# Patient Record
Sex: FEMALE | URBAN - METROPOLITAN AREA
[De-identification: names, ages, dates, MRNs, and addresses within clinical notes are randomized per-mention and may not be internally consistent; named-entity substitution may affect disease eponyms.]

---

## 2023-12-14 ENCOUNTER — OFFICE VISIT (OUTPATIENT)
Dept: PRIMARY CARE | Facility: CLINIC | Age: 88
End: 2023-12-14
Payer: MEDICAID

## 2023-12-14 ENCOUNTER — LAB (OUTPATIENT)
Dept: LAB | Facility: LAB | Age: 88
End: 2023-12-14
Payer: MEDICAID

## 2023-12-14 VITALS
HEART RATE: 64 BPM | TEMPERATURE: 97 F | OXYGEN SATURATION: 99 % | DIASTOLIC BLOOD PRESSURE: 78 MMHG | SYSTOLIC BLOOD PRESSURE: 128 MMHG | BODY MASS INDEX: 32.31 KG/M2 | WEIGHT: 164.6 LBS | HEIGHT: 60 IN

## 2023-12-14 DIAGNOSIS — R20.2 PARESTHESIAS: ICD-10-CM

## 2023-12-14 DIAGNOSIS — R42 VERTIGO: ICD-10-CM

## 2023-12-14 DIAGNOSIS — Z76.89 ENCOUNTER TO ESTABLISH CARE WITH NEW DOCTOR: Primary | ICD-10-CM

## 2023-12-14 DIAGNOSIS — R31.21 ASYMPTOMATIC MICROSCOPIC HEMATURIA: ICD-10-CM

## 2023-12-14 DIAGNOSIS — R42 DIZZINESS: ICD-10-CM

## 2023-12-14 DIAGNOSIS — E55.9 VITAMIN D DEFICIENCY: ICD-10-CM

## 2023-12-14 DIAGNOSIS — E83.52 HYPERCALCEMIA: ICD-10-CM

## 2023-12-14 DIAGNOSIS — M54.9 UPPER BACK PAIN: ICD-10-CM

## 2023-12-14 DIAGNOSIS — F51.04 PSYCHOPHYSIOLOGICAL INSOMNIA: ICD-10-CM

## 2023-12-14 PROBLEM — M62.81 MUSCLE WEAKNESS (GENERALIZED): Status: ACTIVE | Noted: 2022-11-16

## 2023-12-14 PROBLEM — M48.062 SPINAL STENOSIS, LUMBAR REGION WITH NEUROGENIC CLAUDICATION: Status: ACTIVE | Noted: 2022-08-26

## 2023-12-14 PROBLEM — I10 ESSENTIAL HYPERTENSION, BENIGN: Status: ACTIVE | Noted: 2023-03-13

## 2023-12-14 PROBLEM — E78.2 MIXED HYPERLIPIDEMIA: Status: ACTIVE | Noted: 2023-12-14

## 2023-12-14 PROBLEM — M54.50 CHRONIC BILATERAL LOW BACK PAIN WITHOUT SCIATICA: Status: ACTIVE | Noted: 2022-05-24

## 2023-12-14 PROBLEM — E03.9 HYPOTHYROIDISM: Status: ACTIVE | Noted: 2023-02-03

## 2023-12-14 PROBLEM — G89.29 CHRONIC BILATERAL LOW BACK PAIN WITHOUT SCIATICA: Status: ACTIVE | Noted: 2022-05-24

## 2023-12-14 PROBLEM — R06.02 SHORTNESS OF BREATH: Status: ACTIVE | Noted: 2023-03-13

## 2023-12-14 LAB
CA-I BLD-SCNC: 1.42 MMOL/L (ref 1.1–1.33)
FOLATE SERPL-MCNC: 7.3 NG/ML (ref 4.2–19.9)
PTH-INTACT SERPL-MCNC: 85.5 PG/ML (ref 18.5–88)
VIT B12 SERPL-MCNC: 801 PG/ML (ref 211–946)

## 2023-12-14 PROCEDURE — 1125F AMNT PAIN NOTED PAIN PRSNT: CPT | Performed by: FAMILY MEDICINE

## 2023-12-14 PROCEDURE — 99204 OFFICE O/P NEW MOD 45 MIN: CPT | Performed by: FAMILY MEDICINE

## 2023-12-14 PROCEDURE — 1159F MED LIST DOCD IN RCRD: CPT | Performed by: FAMILY MEDICINE

## 2023-12-14 PROCEDURE — 83970 ASSAY OF PARATHORMONE: CPT

## 2023-12-14 PROCEDURE — 82746 ASSAY OF FOLIC ACID SERUM: CPT

## 2023-12-14 PROCEDURE — 3074F SYST BP LT 130 MM HG: CPT | Performed by: FAMILY MEDICINE

## 2023-12-14 PROCEDURE — 82607 VITAMIN B-12: CPT

## 2023-12-14 PROCEDURE — 99214 OFFICE O/P EST MOD 30 MIN: CPT | Performed by: FAMILY MEDICINE

## 2023-12-14 PROCEDURE — 1160F RVW MEDS BY RX/DR IN RCRD: CPT | Performed by: FAMILY MEDICINE

## 2023-12-14 PROCEDURE — 82330 ASSAY OF CALCIUM: CPT

## 2023-12-14 PROCEDURE — 3078F DIAST BP <80 MM HG: CPT | Performed by: FAMILY MEDICINE

## 2023-12-14 PROCEDURE — 1036F TOBACCO NON-USER: CPT | Performed by: FAMILY MEDICINE

## 2023-12-14 PROCEDURE — 36415 COLL VENOUS BLD VENIPUNCTURE: CPT

## 2023-12-14 RX ORDER — PANTOPRAZOLE SODIUM 40 MG/1
40 TABLET, DELAYED RELEASE ORAL AS NEEDED
COMMUNITY
Start: 2023-10-17

## 2023-12-14 RX ORDER — ASPIRIN 81 MG/1
81 TABLET ORAL DAILY
COMMUNITY
Start: 2023-12-04

## 2023-12-14 RX ORDER — ATORVASTATIN CALCIUM 20 MG/1
20 TABLET, FILM COATED ORAL DAILY
COMMUNITY
Start: 2023-11-21

## 2023-12-14 RX ORDER — VALSARTAN 80 MG/1
80 TABLET ORAL DAILY
COMMUNITY
Start: 2023-11-21 | End: 2024-04-04 | Stop reason: DRUGHIGH

## 2023-12-14 RX ORDER — BRIMONIDINE TARTRATE 2 MG/ML
1 SOLUTION/ DROPS OPHTHALMIC 2 TIMES DAILY
COMMUNITY
Start: 2023-11-21 | End: 2024-04-04 | Stop reason: WASHOUT

## 2023-12-14 RX ORDER — DORZOLAMIDE HYDROCHLORIDE AND TIMOLOL MALEATE 20; 5 MG/ML; MG/ML
1 SOLUTION/ DROPS OPHTHALMIC 2 TIMES DAILY
COMMUNITY
Start: 2023-08-04

## 2023-12-14 RX ORDER — MULTIVITAMIN
1 TABLET ORAL DAILY
COMMUNITY
Start: 2023-12-07

## 2023-12-14 RX ORDER — HYDROXYZINE HYDROCHLORIDE 10 MG/1
10 TABLET, FILM COATED ORAL NIGHTLY PRN
Qty: 30 TABLET | Refills: 1 | Status: SHIPPED | OUTPATIENT
Start: 2023-12-14 | End: 2024-01-11 | Stop reason: SDUPTHER

## 2023-12-14 RX ORDER — FLUTICASONE PROPIONATE 50 MCG
2 SPRAY, SUSPENSION (ML) NASAL DAILY
COMMUNITY
Start: 2023-11-21

## 2023-12-14 RX ORDER — PREDNISOLONE ACETATE 10 MG/ML
1 SUSPENSION/ DROPS OPHTHALMIC DAILY
COMMUNITY
Start: 2023-11-16

## 2023-12-14 RX ORDER — METHAZOLAMIDE 50 MG/1
50 TABLET ORAL 2 TIMES DAILY
COMMUNITY
Start: 2023-12-01

## 2023-12-14 RX ORDER — MECLIZINE HCL 12.5 MG 12.5 MG/1
12.5 TABLET ORAL 2 TIMES DAILY PRN
COMMUNITY
Start: 2023-12-07 | End: 2024-02-08 | Stop reason: SDUPTHER

## 2023-12-14 RX ORDER — AMLODIPINE BESYLATE 5 MG/1
5 TABLET ORAL DAILY
COMMUNITY
Start: 2023-11-20 | End: 2024-02-08 | Stop reason: SINTOL

## 2023-12-14 RX ORDER — LEVOTHYROXINE SODIUM 75 UG/1
75 TABLET ORAL
COMMUNITY
Start: 2023-11-21

## 2023-12-14 ASSESSMENT — ENCOUNTER SYMPTOMS
DEPRESSION: 0
OCCASIONAL FEELINGS OF UNSTEADINESS: 0
LOSS OF SENSATION IN FEET: 1

## 2023-12-14 ASSESSMENT — PAIN SCALES - GENERAL: PAINLEVEL: 8

## 2023-12-14 ASSESSMENT — LIFESTYLE VARIABLES: HOW MANY STANDARD DRINKS CONTAINING ALCOHOL DO YOU HAVE ON A TYPICAL DAY: PATIENT DOES NOT DRINK

## 2023-12-14 ASSESSMENT — PATIENT HEALTH QUESTIONNAIRE - PHQ9
SUM OF ALL RESPONSES TO PHQ9 QUESTIONS 1 AND 2: 0
1. LITTLE INTEREST OR PLEASURE IN DOING THINGS: NOT AT ALL
2. FEELING DOWN, DEPRESSED OR HOPELESS: NOT AT ALL

## 2023-12-14 NOTE — PATIENT INSTRUCTIONS
Problem List Items Addressed This Visit    None  Visit Diagnoses         Codes    Encounter to establish care with new doctor    -  Primary Z76.89    Asymptomatic microscopic hematuria     R31.21    Relevant Orders    POCT UA (nonautomated) manually resulted    Vitamin D deficiency     E55.9            Additional Visit Plans:  Plan to recheck urine as you had some slight blood in the urgent care.     Will also recheck calcium labs. Will check Vit B12 and folic acid to see if this is adding to the numbness feeling in your leg. This may be permanent nerve damage.     Continue your Vitamin D dosing that you were doing in October to help maintain levels.     Your other labs are reassuring with no findings to suggest a cause for the dizziness.     Plan to work on posture and your upper back with physical therapy.     Plan to work with physical therapy on your activity level as tolerated. Plan to trial hydroxyzine at night to help you sleep and this very well may help lessen the dizziness.     The dropping of your left eyelid may be from [place]' Palsy. This can lift after a few months, sometimes can be permanent. Continue to see the plastic surgeon. I do not suspect a stroke at this time given the gradual onset of things over a long time.     Follow up in 4 weeks.     Next Wellness Exam/Annual Physical Due  At your earliest convenience / when due    Patient Care Team:  Jeferson Tamayo, DO as PCP - General (Family Medicine)    Jeferson Tamayo,    12/14/23   9:36 AM

## 2023-12-30 DIAGNOSIS — R42 DIZZINESS: Primary | ICD-10-CM

## 2024-01-11 ENCOUNTER — OFFICE VISIT (OUTPATIENT)
Dept: PRIMARY CARE | Facility: CLINIC | Age: 89
End: 2024-01-11
Payer: MEDICAID

## 2024-01-11 VITALS
TEMPERATURE: 97.2 F | DIASTOLIC BLOOD PRESSURE: 70 MMHG | SYSTOLIC BLOOD PRESSURE: 110 MMHG | BODY MASS INDEX: 32.47 KG/M2 | OXYGEN SATURATION: 96 % | HEART RATE: 60 BPM | WEIGHT: 165.4 LBS

## 2024-01-11 DIAGNOSIS — E83.52 HYPERCALCEMIA: ICD-10-CM

## 2024-01-11 DIAGNOSIS — R42 VERTIGO: ICD-10-CM

## 2024-01-11 DIAGNOSIS — F51.04 PSYCHOPHYSIOLOGICAL INSOMNIA: Primary | ICD-10-CM

## 2024-01-11 DIAGNOSIS — M48.062 SPINAL STENOSIS, LUMBAR REGION WITH NEUROGENIC CLAUDICATION: ICD-10-CM

## 2024-01-11 DIAGNOSIS — G89.29 OTHER CHRONIC PAIN: ICD-10-CM

## 2024-01-11 PROCEDURE — 3074F SYST BP LT 130 MM HG: CPT | Performed by: FAMILY MEDICINE

## 2024-01-11 PROCEDURE — 3078F DIAST BP <80 MM HG: CPT | Performed by: FAMILY MEDICINE

## 2024-01-11 PROCEDURE — 1159F MED LIST DOCD IN RCRD: CPT | Performed by: FAMILY MEDICINE

## 2024-01-11 PROCEDURE — 99214 OFFICE O/P EST MOD 30 MIN: CPT | Performed by: FAMILY MEDICINE

## 2024-01-11 PROCEDURE — 1036F TOBACCO NON-USER: CPT | Performed by: FAMILY MEDICINE

## 2024-01-11 PROCEDURE — 1125F AMNT PAIN NOTED PAIN PRSNT: CPT | Performed by: FAMILY MEDICINE

## 2024-01-11 PROCEDURE — 1160F RVW MEDS BY RX/DR IN RCRD: CPT | Performed by: FAMILY MEDICINE

## 2024-01-11 RX ORDER — HYDROXYZINE HYDROCHLORIDE 10 MG/1
20 TABLET, FILM COATED ORAL NIGHTLY PRN
Qty: 60 TABLET | Refills: 1 | Status: SHIPPED | OUTPATIENT
Start: 2024-01-11 | End: 2024-02-08 | Stop reason: SDUPTHER

## 2024-01-11 ASSESSMENT — LIFESTYLE VARIABLES: HOW MANY STANDARD DRINKS CONTAINING ALCOHOL DO YOU HAVE ON A TYPICAL DAY: PATIENT DOES NOT DRINK

## 2024-01-11 ASSESSMENT — ENCOUNTER SYMPTOMS
LOSS OF SENSATION IN FEET: 0
DEPRESSION: 0
OCCASIONAL FEELINGS OF UNSTEADINESS: 0

## 2024-01-11 ASSESSMENT — PAIN SCALES - GENERAL: PAINLEVEL: 7

## 2024-01-11 NOTE — PROGRESS NOTES
Outpatient Visit Note    Chief Complaint   Patient presents with    Follow-up     Followup on pain/weakness/dizziness       HPI:  Ayesha Linton is a 88 y.o. female here for follow-up on her pain and paresthesias.    She is with her  who is helping to translate.     Per chart review, since her visit with me she saw Dr. Leigh Ann Greenfield for follow-up on her low back pain.  She was being evaluated for instability with plans to refer to medical spine specialist for long-term management of back strengthening and flexibility.    She established with me last month at which time we talked about her getting paresthesias in her right leg with a cool feeling and tingling chronically since her back surgery.  Having some vertigo type feelings in her head with any movement.  Did not try the meclizine that was provided by urgent care.  When not doing anything her back feels fine but when working in the kitchen she develops upper back pain easily and feels the need to stretch things out.  Had been doing home exercises but had not been to physical therapy for a while.  Has glaucoma and hypertension.    Our plan was to pursue some blood work to look for any contributing factors towards her paresthesias.  To have her work with physical therapy on her posture and upper back as tolerated and to trial hydroxyzine at night to help with her sleep and possibly lessen her dizziness.  She was also due to make arrangements to see a plastic surgeon about the chronic drooping of her left eyelid from a cosmetic standpoint.    Labs reviewed 12/19/23: Her total cholesterol remains elevated with normal but high end of normal parathyroid hormone.  Folic acid level is normal as well as vitamin B12.  At this time I recommend she see endocrinology for further evaluation of this calcium level and monitoring of her parathyroid hormone.  Elevated calcium can be creating for some of the discomfort she is experiencing in her leg, but this is  most likely a result of her back surgery.  Please check if patient is following closely with a neurologist?  If not, I also recommend having them in her treatment team and a referral can be placed especially in light of her dizziness symptoms.  For now I am going to place a referral to endocrinology.    Referrals were placed in patient's  was going to assist with scheduling these appointments.    Today patient states that she feels the same as last time. Having trouble falling asleep. Did not fall asleep until 4am. Lays in bed unable to fall asleep. This is not every night. On a good day she sometimes falls asleep at 11pm. Wakes up at 2am, falls back asleep. Wakes up at 7am usually to get up. Taking 1 tab of hydroxyzine, which does not seem to make her sleepy.     Vitals today are okay.     She is scheudled for PT, neurology and andocrinology at the end of the month.     PHQ9/GAD7:         Past Medical History:   Diagnosis Date    Disease of thyroid gland     Glaucoma     Hypertension         Current Medications  Current Outpatient Medications   Medication Instructions    amLODIPine (NORVASC) 5 mg, oral, Daily    aspirin 81 mg, oral, Daily    atorvastatin (LIPITOR) 20 mg, oral, Daily    brimonidine (AlphaGAN) 0.2 % ophthalmic solution 1 drop, Left Eye, 2 times daily    dorzolamide-timoloL (Cosopt) 22.3-6.8 mg/mL ophthalmic solution 1 drop, Left Eye, 2 times daily    fluticasone (Flonase) 50 mcg/actuation nasal spray 2 sprays, Each Nostril, Daily    hydrOXYzine HCL (ATARAX) 20 mg, oral, Nightly PRN    levothyroxine (SYNTHROID, LEVOXYL) 75 mcg, oral, Daily before breakfast    meclizine (ANTIVERT) 12.5 mg, oral, 2 times daily PRN    methazolAMIDE (NEPTAZANE) 50 mg, oral, 2 times daily    One Daily Multivitamin tablet 1 tablet, oral, Daily    pantoprazole (PROTONIX) 40 mg, oral, As needed    prednisoLONE acetate (Pred-Forte) 1 % ophthalmic suspension 1 drop, Left Eye, Daily    valsartan (DIOVAN) 80 mg, oral,  Daily        Allergies  No Known Allergies     Past Surgical History:   Procedure Laterality Date    SPINE SURGERY       No family history on file.  Social History     Tobacco Use    Smoking status: Never    Smokeless tobacco: Never   Vaping Use    Vaping Use: Never used   Substance Use Topics    Alcohol use: Never    Drug use: Never     Tobacco Use: Low Risk  (1/11/2024)    Patient History     Smoking Tobacco Use: Never     Smokeless Tobacco Use: Never     Passive Exposure: Not on file        ROS  All pertinent positive symptoms are included in the history of present illness.  All other systems have been reviewed and are negative and noncontributory to this patient's current ailments.    VITAL SIGNS  Vitals:    01/11/24 1309   BP: 110/70   Pulse: 60   Temp: 36.2 °C (97.2 °F)   SpO2: 96%     Vitals:    01/11/24 1309   Weight: 75 kg (165 lb 6.4 oz)      Body mass index is 32.47 kg/m².     PHYSICAL EXAM  GENERAL APPEARANCE: well nourished, well developed, looks like stated age, in no acute distress, not ill or tired appearing, conversing well.   HEENT: no trauma, normocephalic.   NECK: supple without rigidity, no neck mass was observed.   LUNGS: good chest wall expansion. In no acute respiratory distress.   EXTREMITIES: moving all extremities equally with no edema.   SKIN: normal skin color and pigmentation, without rash.   NEUROLOGIC EXAM: CN II-XII grossly intact  PSYCH: mood and affect appropriate; alert and oriented to time, place, person; no difficulty with speech or language.  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 Plan to do 20mg at night to see if this makes you feel a bit more sleepy. You would like to sleep by 10pm. You currently take this medicine 7pm, keep doing that.     Plan to see me the first week of February for follow up.     Plan to trial topical compounded pain cream from [employer]. Can use this 3-4 times daily as needed. Will organize this through your daughter, Silvia Moralez. Rx faxed      Patient Care Team:  Jeferson Tamayo DO as PCP - General (Family Medicine)    Jeferson Tamayo DO   01/11/24   1:42 PM

## 2024-01-11 NOTE — PATIENT INSTRUCTIONS
Problem List Items Addressed This Visit             ICD-10-CM    Vertigo R42    Relevant Medications    hydrOXYzine HCL (Atarax) 10 mg tablet     Other Visit Diagnoses         Codes    Psychophysiological insomnia    -  Primary F51.04    Relevant Medications    hydrOXYzine HCL (Atarax) 10 mg tablet    Hypercalcemia     E83.52            Additional Visit Plans:  Sounds like you need a higher dose of hydroxyzine. Plan to do 20mg at night to see if this makes you feel a bit more sleepy. You would like to sleep by 10pm. You currently take this medicine 7pm, keep doing that.     Plan to see me the first week of February for follow up.     Plan to trial topical compounded pain cream from [employer]. Can use this 3-4 times daily as needed. Will organize this through your daughter, Silvia Moralez.       Patient Care Team:  Jeferson Tamayo DO as PCP - General (Family Medicine)    Jeferson Tamayo DO   01/11/24   1:36 PM

## 2024-01-26 ENCOUNTER — CLINICAL SUPPORT (OUTPATIENT)
Dept: PHYSICAL THERAPY | Facility: CLINIC | Age: 89
End: 2024-01-26
Payer: MEDICAID

## 2024-01-26 DIAGNOSIS — R42 DIZZINESS: ICD-10-CM

## 2024-01-26 DIAGNOSIS — R42 VERTIGO: ICD-10-CM

## 2024-01-26 PROCEDURE — 97162 PT EVAL MOD COMPLEX 30 MIN: CPT | Mod: GP | Performed by: PHYSICAL THERAPIST

## 2024-01-26 NOTE — PROGRESS NOTES
Vestibular Physical Therapy Initial Examination Note    Patient Name: Ayesha Linton  MRN Number: [id number]  Initial Examination Date: 1/26/24  Referring Clinician: Dr. Jeferson Tamayo  Reason for Visit: Dizziness    Time Calculation  Start Time: 0150  Stop Time: 0235  Time Calculation (min): 45 min    Insurance  Visit Number: 1   Approved Visits: 30 (medicaid, no authorization)  Certification/ POC Period: 1/26/24    Precautions  Back surgery 2/16/2023 (L3-5 decompressive laminectomy); low to moderate fall risk.    Problem List  1. Dizziness  Referral to Physical Therapy      2. Vertigo  Referral to Physical Therapy        Subjective  Dizziness, sometimes a little more, sometimes a little less. Dizziness started about 6 months ago. Worse dizziness in the last 3 months. Her left eye is damaged. Difficulty with turning quickly. No dizziness when laying in bed. Some unsteadiness. She uses a cane or a walker for long distances. Wax build up in her ears. Back surgery took care of pain in her legs. Standing for long periods of time makes back sore. Numbness and coldness in her R > L leg. Denies neck pain. Cataracts/ glaucoma in left eye, surgery 10 years ago. Sees eye doctor frequently. She does not know what her dizziness is triggered by.     Inspection  General: She arrives with her  who translates everything for her.  Standard Gait: Unstable, slow, forward flexed trunk  Walking with horizontal head movements: dizziness  Walking with vertical head movements: dizziness    Objective    Cervicogenic/ Cervical Testing  Cervical AROM: Grossly WNL    Postural Stability Testing  Rolando SOP  condition 1: Normal  condition 2: Sway  condition 3: Sway  condition 4: Normal  condition 5: Normal  condition 6: Fall  condition 7: Left    Occulomotor Testing  Saccades Vertical: Normal  Saccades Horizontal: Normal  Gaze Center: Normal  Gaze Right: Normal  Gaze Left: Slight beat to the left  Gaze Upward: Normal  Optokinetic Reflex:  Normal    Vestibulo-Ocular Reflex Testing  Head Thrust Test/ Head Impulse Test: Normal    Provoked Positional Vertigo Testing  Left  Right  Posterior Canal:     Not tested Not tested  Horizontal Canal:   Not tested Not tested    Assessment  Patient presents to physical therapy for complaints of dizziness, clinical examination suggest that vestibular hypofunction is possible.  Subjective does not indicate positional cause of dizziness.  She has a complicated history of recent back surgery because of neurogenic claudication and some of her balance issues and instability can be contributed to likely nerve damage.  Her presentation is even more difficult because she does not speak English.  Complexity of her history on this day prevented evaluation of low back, however she has a follow-up appointment scheduled for reevaluation on her back condition.  Skilled PT services will be utilized to administer vestibular habituation and compensation exercises while instructing on safe balance exercises for home.    Problem List  - Functional limitations/difficulties  - Dizziness  - Reduction in postural control/stability  - Unsteadiness while walking with head movements    Plan  Planned interventions include: Therapeutic exercise , Neuromuscular re-education, Canalith repositioning, Patient education, Home exercise program, VOR training/ gaze stabilization, and Balance exercises    1 Visits/ Week for 8 Weeks    Treatment  None provided on this session.    Home Program  To be issued on future sessions.    Care Plan  - Full return to prior level of function to allow continued home independent capability.  - Full resolution of dizziness for improvements in quality of life.  - Patient to have no falls and negative test on condition 7 within SASHA SOP testing to improve postural control and balance.  - Patient to demonstrates capability of walking for 2 minutes without UE support and vertical/ horizontal at 100 bpm without  unsteadiness.    Plan of care developed in agreement with patient.

## 2024-01-30 ENCOUNTER — OFFICE VISIT (OUTPATIENT)
Dept: ENDOCRINOLOGY | Facility: CLINIC | Age: 89
End: 2024-01-30
Payer: MEDICAID

## 2024-01-30 ENCOUNTER — LAB (OUTPATIENT)
Dept: LAB | Facility: LAB | Age: 89
End: 2024-01-30
Payer: MEDICAID

## 2024-01-30 VITALS
WEIGHT: 166 LBS | DIASTOLIC BLOOD PRESSURE: 96 MMHG | HEART RATE: 75 BPM | SYSTOLIC BLOOD PRESSURE: 146 MMHG | BODY MASS INDEX: 38.42 KG/M2 | HEIGHT: 55 IN

## 2024-01-30 DIAGNOSIS — R20.2 PARESTHESIAS: ICD-10-CM

## 2024-01-30 DIAGNOSIS — E83.52 HYPERCALCEMIA: ICD-10-CM

## 2024-01-30 DIAGNOSIS — R42 DIZZINESS: ICD-10-CM

## 2024-01-30 LAB — PTH-INTACT SERPL-MCNC: 89.9 PG/ML (ref 18.5–88)

## 2024-01-30 PROCEDURE — 36415 COLL VENOUS BLD VENIPUNCTURE: CPT

## 2024-01-30 PROCEDURE — 1160F RVW MEDS BY RX/DR IN RCRD: CPT | Performed by: INTERNAL MEDICINE

## 2024-01-30 PROCEDURE — 80053 COMPREHEN METABOLIC PANEL: CPT

## 2024-01-30 PROCEDURE — 1159F MED LIST DOCD IN RCRD: CPT | Performed by: INTERNAL MEDICINE

## 2024-01-30 PROCEDURE — 1126F AMNT PAIN NOTED NONE PRSNT: CPT | Performed by: INTERNAL MEDICINE

## 2024-01-30 PROCEDURE — 99214 OFFICE O/P EST MOD 30 MIN: CPT | Performed by: INTERNAL MEDICINE

## 2024-01-30 PROCEDURE — 1036F TOBACCO NON-USER: CPT | Performed by: INTERNAL MEDICINE

## 2024-01-30 PROCEDURE — 3080F DIAST BP >= 90 MM HG: CPT | Performed by: INTERNAL MEDICINE

## 2024-01-30 PROCEDURE — 3077F SYST BP >= 140 MM HG: CPT | Performed by: INTERNAL MEDICINE

## 2024-01-30 PROCEDURE — 83970 ASSAY OF PARATHORMONE: CPT

## 2024-01-30 ASSESSMENT — PAIN SCALES - GENERAL: PAINLEVEL: 0-NO PAIN

## 2024-01-30 NOTE — PROGRESS NOTES
First visit     Reason for referral: Hypercalcemia.     HPI:  88 years old female with PMH of HTN, Hypothyroidism referred to [facility] for evaluation of hypercalcemia.    Pt. Speaks American , accompanied by a  , who translated for us.  Pt. C/o dizziness (was seen by PCP , prescribed with Meclizine).  She denies known H/O elevated Ca level , any problems related to PTH.  Denies heat / cold intolerance , palpitations, diarrhea / constipation.   Takes hydroxyzine to sleep at night.  Not sure for how long , she is on LT4 replacement.  Most recent TSH level is 2 (10/23)    Social history:  Denies smoking , illicit drug use.    Family history:  H/O kidney stones in mother.   FH not significant for thyroid disorders.     ROS:  10 points ROS done , negative except as above.     Physical examination:      General: Patient is alert / oriented *3, in no acute distress.   HENT: Head normocephalic, atraumatic  Neck: supple, ROM normal, small palpable non tender goiter.   Eyes: EOMI intact  Respiratory: RR normal , equal air entry B/L, no added sounds  CVS: HR normal, S1+S2+0  Abdomen: no abdominal distension.   Musculoskeletal: normal gait, no peripheral edema, walking with a cane.   CNS: Cranial nerves grossly intact. No weakness, DTRs are not exaggerated / no delayed relaxation phase.  Psych: appropriate mood       Current Outpatient Medications   Medication Sig Dispense Refill    amLODIPine (Norvasc) 5 mg tablet Take 1 tablet (5 mg) by mouth once daily.      aspirin 81 mg EC tablet Take 1 tablet (81 mg) by mouth once daily.      atorvastatin (Lipitor) 20 mg tablet Take 1 tablet (20 mg) by mouth once daily.      brimonidine (AlphaGAN) 0.2 % ophthalmic solution Administer 1 drop into the left eye 2 times a day.      dorzolamide-timoloL (Cosopt) 22.3-6.8 mg/mL ophthalmic solution Administer 1 drop into the left eye 2 times a day.      fluticasone (Flonase) 50 mcg/actuation nasal spray Administer 2  sprays into each nostril once daily.      hydrOXYzine HCL (Atarax) 10 mg tablet Take 2 tablets (20 mg) by mouth as needed at bedtime (insomnia). 60 tablet 1    methazolAMIDE (Neptazane) 50 mg tablet Take 1 tablet (50 mg) by mouth 2 times a day.      One Daily Multivitamin tablet Take 1 tablet by mouth once daily.      pantoprazole (ProtoNix) 40 mg EC tablet Take 1 tablet (40 mg) by mouth if needed (heart burn).      prednisoLONE acetate (Pred-Forte) 1 % ophthalmic suspension Administer 1 drop into the left eye early in the morning..      valsartan (Diovan) 80 mg tablet Take 1 tablet (80 mg) by mouth once daily.      levothyroxine (Synthroid, Levoxyl) 75 mcg tablet Take 1 tablet (75 mcg) by mouth once daily in the morning. Take before meals.      meclizine (Antivert) 12.5 mg tablet Take 1 tablet (12.5 mg) by mouth 2 times a day as needed.       No current facility-administered medications for this visit.      Labs:  Lab Results   Component Value Date    CAION 1.42 (H) 12/14/2023    PTH 85.5 12/14/2023     Assessment and plan:    # 88 years old female referred for concerns of hypercalcemia.    As per her labs (since 5/2022) - her Corrected ca level is in upper normal range 10.1 - 10.5 .  Most recent PTH 85.5 (12/23)   Cr 0.77 , GFR 75 (as per 10/23)  Vitamin D 38 < 32 < 47.5    Pt. Does c/o kidney stones , but no recent renal colic.   FH not significant.    Pt. Asymptomatic (no constipation, significant aches and pains ).     --- Pt. Clinically and biochemically euthyroid.  --- Corrected Ca in upper normal range , no need for any particular intervention for now.     - will get repeat CMP and PTH today  - will follow the lab results and inform her about that  - Pt. Is advised to keep herself well hydrated  - Continue LT4 75 mcg daily      (Empty stomach , without combining with food or other medicines)   - RTC in one year , earlier if any particular concern.    Patient seen / examined and discussed with attending -   Kurt

## 2024-01-31 LAB
ALBUMIN SERPL BCP-MCNC: 4.1 G/DL (ref 3.4–5)
ALP SERPL-CCNC: 103 U/L (ref 33–136)
ALT SERPL W P-5'-P-CCNC: 7 U/L (ref 7–45)
ANION GAP SERPL CALC-SCNC: 13 MMOL/L (ref 10–20)
AST SERPL W P-5'-P-CCNC: 17 U/L (ref 9–39)
BILIRUB SERPL-MCNC: 0.5 MG/DL (ref 0–1.2)
BUN SERPL-MCNC: 24 MG/DL (ref 6–23)
CALCIUM SERPL-MCNC: 10.7 MG/DL (ref 8.6–10.6)
CHLORIDE SERPL-SCNC: 107 MMOL/L (ref 98–107)
CO2 SERPL-SCNC: 27 MMOL/L (ref 21–32)
CREAT SERPL-MCNC: 0.87 MG/DL (ref 0.5–1.05)
EGFRCR SERPLBLD CKD-EPI 2021: 65 ML/MIN/1.73M*2
GLUCOSE SERPL-MCNC: 91 MG/DL (ref 74–99)
POTASSIUM SERPL-SCNC: 4.9 MMOL/L (ref 3.5–5.3)
PROT SERPL-MCNC: 6.9 G/DL (ref 6.4–8.2)
SODIUM SERPL-SCNC: 142 MMOL/L (ref 136–145)

## 2024-01-31 ASSESSMENT — ENCOUNTER SYMPTOMS
LOSS OF SENSATION IN FEET: 1
OCCASIONAL FEELINGS OF UNSTEADINESS: 1

## 2024-02-01 ENCOUNTER — APPOINTMENT (OUTPATIENT)
Dept: PHYSICAL THERAPY | Facility: CLINIC | Age: 89
End: 2024-02-01
Payer: MEDICAID

## 2024-02-02 ENCOUNTER — TREATMENT (OUTPATIENT)
Dept: PHYSICAL THERAPY | Facility: CLINIC | Age: 89
End: 2024-02-02
Payer: MEDICAID

## 2024-02-02 DIAGNOSIS — R42 DIZZINESS: Primary | ICD-10-CM

## 2024-02-02 PROCEDURE — 97112 NEUROMUSCULAR REEDUCATION: CPT | Mod: GP | Performed by: PHYSICAL THERAPIST

## 2024-02-02 NOTE — PROGRESS NOTES
Vestibular Physical Therapy Initial Examination Note    Patient Name: Ayesha Linton  MRN Number: [id number]  Initial Examination Date: 1/26/24  Referring Clinician: Dr. Jeferson Tamayo  Reason for Visit: Dizziness    Time Calculation  Start Time: 0146  Stop Time: 0232  Time Calculation (min): 46 min    Insurance  Visit Number: 2  Approved Visits: 30 (medicaid, no authorization)  Certification/ POC Period: 1/26/24 - 3/22/24    Precautions  Back surgery 2/16/2023 (L3-5 decompressive laminectomy); low to moderate fall risk.    Problem List  1. Dizziness          Subjective  She has worked on her back in the past and has exercises that she has been performing consistently.  At this time she does not feel that it is necessary to have more therapy on her back and would like to focus on her stability and dizziness in therapy.    Treatment  Neuromuscular Reeducation   - standing gaze stabilization horizontal 2x30 seconds   - standing gaze stabilization vertical 2x30 seconds   - corner balance    - tandem stance x1 min each side    - feet together eyes closed 2x1 min   - issued/ reviewed home exercise program    Assessment  Gaze stabilization exercises successfully reproduce dizziness which resolves with a brief rest break.    Plan  Planned interventions include: Therapeutic exercise , Neuromuscular re-education, Canalith repositioning, Patient education, Home exercise program, VOR training/ gaze stabilization, and Balance exercises    1 Visits/ Week for 8 Weeks    Home Program  Access Code: [email or web address]  URL: [email or web address]  Date: 02/02/2024  Prepared by: Gurpreet Smiley    Exercises  - Standing Gaze Stabilization with Head Rotation  - 1-2 x daily - 7 x weekly - 2 sets -  seconds  - Standing Gaze Stabilization with Head Nod  - 1-2 x daily - 7 x weekly - 2 sets -  seconds  - Corner Balance Feet Together With Eyes Closed  - 1-2 x daily - 7 x weekly - 2 sets -  sec hold  - Semi-Tandem  Corner Balance With Eyes Open  - 1-2 x daily - 7 x weekly - 2 sets -  sec hold    Care Plan  - Full return to prior level of function to allow continued home independent capability.  - Full resolution of dizziness for improvements in quality of life.  - Patient to have no falls and negative test on condition 7 within SASHA SOP testing to improve postural control and balance.  - Patient to demonstrates capability of walking for 2 minutes without UE support and vertical/ horizontal at 100 bpm without unsteadiness.    Plan of care developed in agreement with patient.

## 2024-02-05 ENCOUNTER — DOCUMENTATION (OUTPATIENT)
Dept: ENDOCRINOLOGY | Facility: HOSPITAL | Age: 89
End: 2024-02-05
Payer: MEDICAID

## 2024-02-05 NOTE — PROGRESS NOTES
Lab results reviewed-    Corrected Ca level is 10.6 with PTH of 89.9  No change in management plan.  RTC in one year.

## 2024-02-08 ENCOUNTER — OFFICE VISIT (OUTPATIENT)
Dept: PRIMARY CARE | Facility: CLINIC | Age: 89
End: 2024-02-08
Payer: MEDICAID

## 2024-02-08 VITALS
HEART RATE: 60 BPM | OXYGEN SATURATION: 96 % | DIASTOLIC BLOOD PRESSURE: 70 MMHG | TEMPERATURE: 98.3 F | HEIGHT: 55 IN | SYSTOLIC BLOOD PRESSURE: 104 MMHG | WEIGHT: 168 LBS | BODY MASS INDEX: 38.88 KG/M2

## 2024-02-08 DIAGNOSIS — E75.6 ABNORMAL DEPOSITS OF LIPID: ICD-10-CM

## 2024-02-08 DIAGNOSIS — R42 DIZZINESS: ICD-10-CM

## 2024-02-08 DIAGNOSIS — R42 VERTIGO: ICD-10-CM

## 2024-02-08 DIAGNOSIS — R94.31 ABNORMAL EKG: ICD-10-CM

## 2024-02-08 DIAGNOSIS — I95.9 HYPOTENSION, UNSPECIFIED HYPOTENSION TYPE: ICD-10-CM

## 2024-02-08 DIAGNOSIS — G89.29 OTHER CHRONIC PAIN: ICD-10-CM

## 2024-02-08 DIAGNOSIS — F51.04 PSYCHOPHYSIOLOGICAL INSOMNIA: Primary | ICD-10-CM

## 2024-02-08 DIAGNOSIS — R20.2 PARESTHESIAS: ICD-10-CM

## 2024-02-08 DIAGNOSIS — M62.81 MUSCLE WEAKNESS (GENERALIZED): ICD-10-CM

## 2024-02-08 PROCEDURE — 93005 ELECTROCARDIOGRAM TRACING: CPT | Performed by: FAMILY MEDICINE

## 2024-02-08 PROCEDURE — 1126F AMNT PAIN NOTED NONE PRSNT: CPT | Performed by: FAMILY MEDICINE

## 2024-02-08 PROCEDURE — 99215 OFFICE O/P EST HI 40 MIN: CPT | Performed by: FAMILY MEDICINE

## 2024-02-08 PROCEDURE — 93010 ELECTROCARDIOGRAM REPORT: CPT | Performed by: FAMILY MEDICINE

## 2024-02-08 PROCEDURE — 3074F SYST BP LT 130 MM HG: CPT | Performed by: FAMILY MEDICINE

## 2024-02-08 PROCEDURE — 3078F DIAST BP <80 MM HG: CPT | Performed by: FAMILY MEDICINE

## 2024-02-08 PROCEDURE — 1036F TOBACCO NON-USER: CPT | Performed by: FAMILY MEDICINE

## 2024-02-08 PROCEDURE — 1160F RVW MEDS BY RX/DR IN RCRD: CPT | Performed by: FAMILY MEDICINE

## 2024-02-08 PROCEDURE — 1159F MED LIST DOCD IN RCRD: CPT | Performed by: FAMILY MEDICINE

## 2024-02-08 RX ORDER — MECLIZINE HCL 12.5 MG 12.5 MG/1
12.5 TABLET ORAL 2 TIMES DAILY PRN
Qty: 30 TABLET | Refills: 1 | Status: SHIPPED | OUTPATIENT
Start: 2024-02-08

## 2024-02-08 RX ORDER — GABAPENTIN 100 MG/1
200 CAPSULE ORAL NIGHTLY
Qty: 60 CAPSULE | Refills: 0 | Status: SHIPPED | OUTPATIENT
Start: 2024-02-08 | End: 2024-03-07 | Stop reason: WASHOUT

## 2024-02-08 RX ORDER — HYDROXYZINE HYDROCHLORIDE 10 MG/1
20 TABLET, FILM COATED ORAL NIGHTLY PRN
Qty: 60 TABLET | Refills: 1 | Status: SHIPPED | OUTPATIENT
Start: 2024-02-08 | End: 2024-04-04 | Stop reason: SDUPTHER

## 2024-02-08 RX ORDER — DULOXETIN HYDROCHLORIDE 20 MG/1
20 CAPSULE, DELAYED RELEASE ORAL
COMMUNITY
Start: 2024-02-05 | End: 2024-03-07 | Stop reason: SDUPTHER

## 2024-02-08 RX ORDER — OXYMETAZOLINE HYDROCHLORIDE OPHTHALMIC 1 MG/ML
1 SOLUTION/ DROPS OPHTHALMIC
COMMUNITY
Start: 2024-02-06

## 2024-02-08 ASSESSMENT — LIFESTYLE VARIABLES: HOW MANY STANDARD DRINKS CONTAINING ALCOHOL DO YOU HAVE ON A TYPICAL DAY: PATIENT DOES NOT DRINK

## 2024-02-08 ASSESSMENT — PATIENT HEALTH QUESTIONNAIRE - PHQ9
2. FEELING DOWN, DEPRESSED OR HOPELESS: NOT AT ALL
SUM OF ALL RESPONSES TO PHQ9 QUESTIONS 1 AND 2: 0
1. LITTLE INTEREST OR PLEASURE IN DOING THINGS: NOT AT ALL

## 2024-02-08 ASSESSMENT — PAIN SCALES - GENERAL: PAINLEVEL: 0-NO PAIN

## 2024-02-08 NOTE — SIGNIFICANT EVENT
Billing based on time which included chart preparation, obtaining HPI from patient and via translation from her , performing physical examination, making medication adjustments and discussing how medications work or certain side effects of other medications that are being removed, placement of referrals and documenting in the chart the same day.

## 2024-02-08 NOTE — PROGRESS NOTES
Outpatient Visit Note    Chief Complaint   Patient presents with    Follow-up     followup       HPI:  Ayesha Linton is a 88 y.o. female here for follow-up on her pain and paresthesias.    She is with her  who is helping to translate.      Seen last month for follow-up at which time she was still having trouble with falling asleep, not being able to fall asleep until 4 AM on some nights.  Was using 1 tablet of hydroxyzine which did not seem to be effective.  We discussed making arrangements to schedule for physical therapy, neurology evaluation and endocrinology evaluation.    I recommended doing 20 mg of hydroxyzine at night to see if this would help and to have her take this at 7 PM with the hope that she would be able to target going to bed at 10 PM.  I also had her trial a topical compounded pain cream from [facility] for her chronic pain related to spinal stenosis.    Today she states that her blood pressure is low today. 114/74 this morning with HR 48. Last night 129/83 with HR 76. Bps have been ranging previous to that 119-149//71-86 with HR 48-75. She does not see a cardiologist, not on a beta blocker. Dizziness and weakness is worse when numbers are lower. Her head does feel heavy. Says she does not feel anything when BP is high, but she feels bad when it is low. She did eat and drink today. No CP or SOB.     Sleeping 9pm-midnight then wakes up and cannot fall back asleep. Not waking up to use the restroom. Not interested in seeing a sleep medicine doctor or changing things right now.     Her legs feel cold at times, then feel hot. Feels heavy sometimes. No pain. Ankle down to her food feels numb, like a wooden foot. Worsening over time.     History of abnormal fat deposits in her thighs since childhood, now significant accumulations which make it hard for her to bend her legs. Very bothersome to her. Last lipid panel in October was ok.     Has a vascular doctor that she is  going to see.     PHQ9/GAD7:         Past Medical History:   Diagnosis Date    Disease of thyroid gland     Glaucoma     Hypertension         Current Medications  Current Outpatient Medications   Medication Instructions    aspirin 81 mg, oral, Daily    atorvastatin (LIPITOR) 20 mg, oral, Daily    brimonidine (AlphaGAN) 0.2 % ophthalmic solution 1 drop, Left Eye, 2 times daily    dorzolamide-timoloL (Cosopt) 22.3-6.8 mg/mL ophthalmic solution 1 drop, Left Eye, 2 times daily    DULoxetine (CYMBALTA) 20 mg, oral, Daily RT    fluticasone (Flonase) 50 mcg/actuation nasal spray 2 sprays, Each Nostril, Daily    gabapentin (NEURONTIN) 200 mg, oral, Nightly    hydrOXYzine HCL (ATARAX) 20 mg, oral, Nightly PRN    levothyroxine (SYNTHROID, LEVOXYL) 75 mcg, oral, Daily before breakfast    meclizine (ANTIVERT) 12.5 mg, oral, 2 times daily PRN    methazolAMIDE (NEPTAZANE) 50 mg, oral, 2 times daily    One Daily Multivitamin tablet 1 tablet, oral, Daily    oxymetazoline, PF, (Upneeq, PF,) 0.1 % dropperette 1 drop, ophthalmic (eye), Daily RT    pantoprazole (PROTONIX) 40 mg, oral, As needed    prednisoLONE acetate (Pred-Forte) 1 % ophthalmic suspension 1 drop, Left Eye, Daily    valsartan (DIOVAN) 80 mg, oral, Daily        Allergies  No Known Allergies     Past Surgical History:   Procedure Laterality Date    SPINE SURGERY       No family history on file.  Social History     Tobacco Use    Smoking status: Never    Smokeless tobacco: Never   Vaping Use    Vaping Use: Never used   Substance Use Topics    Alcohol use: Yes     Comment: soc    Drug use: Never     Tobacco Use: Low Risk  (2/8/2024)    Patient History     Smoking Tobacco Use: Never     Smokeless Tobacco Use: Never     Passive Exposure: Not on file        ROS  All pertinent positive symptoms are included in the history of present illness.  All other systems have been reviewed and are negative and noncontributory to this patient's current ailments.    VITAL SIGNS  Vitals:     02/08/24 1358   BP: 104/70   Pulse: 60   Temp: 36.8 °C (98.3 °F)   SpO2: 96%     Vitals:    02/08/24 1358   Weight: 76.2 kg (168 lb)      Body mass index is 43.68 kg/m².     PHYSICAL EXAM  GENERAL APPEARANCE: well nourished, well developed, looks like stated age, in no acute distress, not ill or tired appearing, conversing well.   HEENT: no trauma, normocephalic.   NECK: supple without rigidity, no neck mass was observed  HEART: regular rate and rhythm, S1 and S2 heard with no murmurs or skipped beats.   LUNGS: clear to auscultation bilaterally with no wheezes, crackles or rales.   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 No LE pitting edema  SKIN: normal skin color and pigmentation, normal skin turgor without rash, lesions, or nodules visualized.   NEUROLOGIC EXAM: CN II-XII grossly intact  PSYCH: mood and affect appropriate; alert     Assessment/Plan   Problem List Items Addressed This Visit             ICD-10-CM    Dizziness R42    Relevant Medications    meclizine (Antivert) 12.5 mg tablet    Other Relevant Orders    ECG 12 lead (Clinic Performed)    Referral to Cardiology    Referral to Neurology    Muscle weakness (generalized) M62.81    Relevant Orders    Referral to Neurology     Other Visit Diagnoses         Codes    Psychophysiological insomnia    -  Primary F51.04    Relevant Orders    Referral to Neurology    Other chronic pain     G89.29    Relevant Orders    Referral to Neurology    Hypotension, unspecified hypotension type     I95.9    Relevant Orders    ECG 12 lead (Clinic Performed)    Referral to Cardiology    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 Plan to see cardiology for further evaluation but we will get an EKG today.     Continue the hydroxyzine at night.     Plan to stop amlodipine. I am not going to add any more blood pressure coverage for now, stay on  the 80mg Valsartan daily and measure new blood pressures to show the cardiologist.     Trial Gabapentin at night, 200mg to see if this helps with the numbness in your feet while you go see vascular.     Follow up in 1 month    Patient Care Team:  Jeferson Tamayo DO as PCP - General (Family Medicine)    Jeferson Tamayo DO   02/08/24   2:45 PM

## 2024-02-08 NOTE — PATIENT INSTRUCTIONS
Problem List Items Addressed This Visit             ICD-10-CM    Dizziness R42    Relevant Medications    meclizine (Antivert) 12.5 mg tablet    Other Relevant Orders    ECG 12 lead (Clinic Performed)    Referral to Cardiology    Referral to Neurology    Muscle weakness (generalized) M62.81    Relevant Orders    Referral to Neurology     Other Visit Diagnoses         Codes    Psychophysiological insomnia    -  Primary F51.04    Relevant Orders    Referral to Neurology    Other chronic pain     G89.29    Relevant Orders    Referral to Neurology    Hypotension, unspecified hypotension type     I95.9    Relevant Orders    ECG 12 lead (Clinic Performed)    Referral to Cardiology    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 Plan to see cardiology for further evaluation but we will get an EKG today.     Continue the hydroxyzine at night.     Plan to stop amlodipine. I am not going to add any more blood pressure coverage for now, stay on  the 80mg Valsartan daily and measure new blood pressures to show the cardiologist.     Trial Gabapentin at night, 200mg to see if this helps with the numbness in your feet while you go see vascular.     Follow up in 1 month    Patient Care Team:  Jeferson Tamayo DO as PCP - General (Family Medicine)    Jeferson Tamayo DO   02/08/24   2:45 PM

## 2024-02-09 ENCOUNTER — TREATMENT (OUTPATIENT)
Dept: PHYSICAL THERAPY | Facility: CLINIC | Age: 89
End: 2024-02-09
Payer: MEDICAID

## 2024-02-09 DIAGNOSIS — R42 DIZZINESS: Primary | ICD-10-CM

## 2024-02-09 PROCEDURE — 97112 NEUROMUSCULAR REEDUCATION: CPT | Mod: GP | Performed by: PHYSICAL THERAPIST

## 2024-02-09 NOTE — PROGRESS NOTES
Vestibular Physical Therapy Initial Examination Note    Patient Name: Ayesha Linton  MRN Number: [id number]  Initial Examination Date: 1/26/24  Referring Clinician: Dr. Jeferson Tamayo  Reason for Visit: Dizziness    Time Calculation  Start Time: 0946  Stop Time: 1028  Time Calculation (min): 42 min    Insurance  Visit Number: 3  Approved Visits: 30 (medicaid, no authorization)  Certification/ POC Period: 1/26/24    Precautions  Back surgery 2/16/2023 (L3-5 decompressive laminectomy); low to moderate fall risk.    Problem List  1. Dizziness          Subjective  She has noticed some improvements in her stability since she was seen in therapy last week, during the exercises.  She has been doing the issued exercises daily at home.    Treatment  Neuromuscular Reeducation   - gaze stabilization horizontal 2x1 min   - gaze stabilization vertical 2x1 min   - corner balance    - feet together eyes closed 2x1 min    - tandem stance x1 min each side   - walking with head nods and rotation 2x30 seconds each direction   - forward stepping over tape on floor, x10 each side, CGA   - discussed increase in duration and repetitions on exercises    Assessment  Improved stability with corner balance exercises.  She was able to increase gaze stabilization exercises from 30 seconds on her last session, 1 minute on the session.  Exercises do provoke symptoms, with rest breaks successfully able to stabilize symptoms.    Plan  Planned interventions include: Therapeutic exercise , Neuromuscular re-education, Canalith repositioning, Patient education, Home exercise program, VOR training/ gaze stabilization, and Balance exercises    1 Visits/ Week for 8 Weeks    Home Program  Access Code: [email or web address]  URL: [email or web address]  Date: 02/02/2024  Prepared by: Gurpreet Smiley    Exercises  - Standing Gaze Stabilization with Head Rotation  - 1-2 x daily - 7 x weekly - 2 sets -  seconds  - Standing Gaze Stabilization with  Head Nod  - 1-2 x daily - 7 x weekly - 2 sets -  seconds  - Corner Balance Feet Together With Eyes Closed  - 1-2 x daily - 7 x weekly - 2 sets -  sec hold  - Semi-Tandem Corner Balance With Eyes Open  - 1-2 x daily - 7 x weekly - 2 sets -  sec hold    Care Plan  - Full return to prior level of function to allow continued home independent capability.  - Full resolution of dizziness for improvements in quality of life.  - Patient to have no falls and negative test on condition 7 within [facility] SOP testing to improve postural control and balance.  - Patient to demonstrates capability of walking for 2 minutes without UE support and vertical/ horizontal at 100 bpm without unsteadiness.    Plan of care developed in agreement with patient.

## 2024-02-23 ENCOUNTER — TREATMENT (OUTPATIENT)
Dept: PHYSICAL THERAPY | Facility: CLINIC | Age: 89
End: 2024-02-23
Payer: MEDICAID

## 2024-02-23 DIAGNOSIS — R42 DIZZINESS: Primary | ICD-10-CM

## 2024-02-23 PROBLEM — R26.81 UNSTEADINESS ON FEET: Status: ACTIVE | Noted: 2024-02-23

## 2024-02-23 PROCEDURE — 97112 NEUROMUSCULAR REEDUCATION: CPT | Mod: GP | Performed by: PHYSICAL THERAPIST

## 2024-02-23 NOTE — PROGRESS NOTES
Vestibular Physical Therapy Initial Examination Note    Patient Name: Ayesha Linton  MRN Number: [id number]  Initial Examination Date: 1/26/24  Referring Clinician: Dr. Jeferson Tamayo  Reason for Visit: Dizziness    Time Calculation  Start Time: 0145  Stop Time: 0229  Time Calculation (min): 44 min    Insurance  Visit Number: 4  Approved Visits: 30 (medicaid, no authorization)  Certification/ POC Period: 1/26/24    Precautions  Back surgery 2/16/2023 (L3-5 decompressive laminectomy); low to moderate fall risk.    Problem List  1. Dizziness          Subjective  Dizziness is better. Cannot process if she is dizzy now, issues with her eyes can make her feel dizzy.     Treatment  Neuromuscular Reeducation   - walking with head nods and rotation 2x30 seconds each direction   - forward stepping over tape on floor, x10 each side, CGA   - lateral high stepping over tape on floor x10 each side, CGA   - gaze stabilization horizontal on air-ex pad 2x1 min   - gaze stabilization vertical on air-ex pad 2x1 min   - tandem walking   - forward step ups on box    Assessment  She continues to participate in higher level balance exercises, showing improvements in stability.     Plan  Planned interventions include: Therapeutic exercise , Neuromuscular re-education, Canalith repositioning, Patient education, Home exercise program, VOR training/ gaze stabilization, and Balance exercises    1 Visits/ Week for 8 Weeks    Home Program  Access Code: [email or web address]  URL: [email or web address]  Date: 02/02/2024  Prepared by: Gurpreet Smiley    Exercises  - Standing Gaze Stabilization with Head Rotation  - 1-2 x daily - 7 x weekly - 2 sets -  seconds  - Standing Gaze Stabilization with Head Nod  - 1-2 x daily - 7 x weekly - 2 sets -  seconds  - Corner Balance Feet Together With Eyes Closed  - 1-2 x daily - 7 x weekly - 2 sets -  sec hold  - Semi-Tandem Corner Balance With Eyes Open  - 1-2 x daily - 7 x weekly - 2  sets -  sec hold    Care Plan  - Full return to prior level of function to allow continued home independent capability.  - Full resolution of dizziness for improvements in quality of life.  - Patient to have no falls and negative test on condition 7 within [facility] SOP testing to improve postural control and balance.  - Patient to demonstrates capability of walking for 2 minutes without UE support and vertical/ horizontal at 100 bpm without unsteadiness.    Plan of care developed in agreement with patient.

## 2024-02-29 ENCOUNTER — APPOINTMENT (OUTPATIENT)
Dept: PHYSICAL THERAPY | Facility: CLINIC | Age: 89
End: 2024-02-29
Payer: MEDICAID

## 2024-03-07 ENCOUNTER — OFFICE VISIT (OUTPATIENT)
Dept: PRIMARY CARE | Facility: CLINIC | Age: 89
End: 2024-03-07
Payer: MEDICAID

## 2024-03-07 VITALS
HEART RATE: 75 BPM | BODY MASS INDEX: 27.82 KG/M2 | WEIGHT: 167 LBS | OXYGEN SATURATION: 95 % | SYSTOLIC BLOOD PRESSURE: 122 MMHG | TEMPERATURE: 96 F | DIASTOLIC BLOOD PRESSURE: 80 MMHG | HEIGHT: 65 IN

## 2024-03-07 DIAGNOSIS — I10 ESSENTIAL HYPERTENSION, BENIGN: ICD-10-CM

## 2024-03-07 DIAGNOSIS — R42 DIZZINESS: ICD-10-CM

## 2024-03-07 DIAGNOSIS — I95.9 HYPOTENSION, UNSPECIFIED HYPOTENSION TYPE: Primary | ICD-10-CM

## 2024-03-07 DIAGNOSIS — M48.062 SPINAL STENOSIS, LUMBAR REGION WITH NEUROGENIC CLAUDICATION: ICD-10-CM

## 2024-03-07 PROCEDURE — 1036F TOBACCO NON-USER: CPT | Performed by: FAMILY MEDICINE

## 2024-03-07 PROCEDURE — 3074F SYST BP LT 130 MM HG: CPT | Performed by: FAMILY MEDICINE

## 2024-03-07 PROCEDURE — 3079F DIAST BP 80-89 MM HG: CPT | Performed by: FAMILY MEDICINE

## 2024-03-07 PROCEDURE — 1159F MED LIST DOCD IN RCRD: CPT | Performed by: FAMILY MEDICINE

## 2024-03-07 PROCEDURE — 1160F RVW MEDS BY RX/DR IN RCRD: CPT | Performed by: FAMILY MEDICINE

## 2024-03-07 PROCEDURE — 99214 OFFICE O/P EST MOD 30 MIN: CPT | Performed by: FAMILY MEDICINE

## 2024-03-07 PROCEDURE — 1125F AMNT PAIN NOTED PAIN PRSNT: CPT | Performed by: FAMILY MEDICINE

## 2024-03-07 RX ORDER — DULOXETINE 40 MG/1
40 CAPSULE, DELAYED RELEASE ORAL
Qty: 90 CAPSULE | Refills: 0 | Status: SHIPPED | OUTPATIENT
Start: 2024-03-07 | End: 2024-04-04 | Stop reason: SDUPTHER

## 2024-03-07 ASSESSMENT — PAIN SCALES - GENERAL: PAINLEVEL: 7

## 2024-03-07 ASSESSMENT — PATIENT HEALTH QUESTIONNAIRE - PHQ9
1. LITTLE INTEREST OR PLEASURE IN DOING THINGS: NOT AT ALL
SUM OF ALL RESPONSES TO PHQ9 QUESTIONS 1 AND 2: 0
2. FEELING DOWN, DEPRESSED OR HOPELESS: NOT AT ALL

## 2024-03-07 NOTE — PROGRESS NOTES
Outpatient Visit Note    Chief Complaint   Patient presents with    Follow-up     4 week follow up        HPI:  Ayesha Linton is a 88 y.o. female here for follow-up, reports right knee pain.    She is with her  who is helping to translate.       At her last visit I recommended she see cardiology as it seemed that she was dipping low with blood pressure or heart rate at times.  I discontinued her amlodipine and kept her on her 80 mg of valsartan daily.     I recommended she continue hydroxyzine 20 mg as needed at night for insomnia, relying on a higher dose of this medicine. She says just 10mg nightly is good for her.     I recommended a trial of 200 mg of gabapentin at night to see if this would help with the numbness in her feet until she can get in with the vascular doctor.  On 2/22/2024 she saw a vascular doctor at the [facility].  Comprehensive Doppler ultrasound was unremarkable.  Low suspicion for arterial etiology but will have her pursue venous studies and evaluation.  She was referred to plastic surgery for an area of of extra tissue by her thigh.  Has an upcoming appointment 3/12/2024 for this.    Sees pain management/a spinal doctor on 3/19/2024 for further assessment.    She did start vestibular rehab for her dizziness and has noticed much improvements there.    Right knee pain is getting worse with the soft tissue growths pressing on this joint. She had large volume tissue growths removed from her left knee and shoulder in the past with good relief. Wants to pause physical therapy for now as it hurts to much to go.    Took gabapentin for 2 weeks with no change.     She is using a serum on her eyelids from plastic surgery and with this her dizziness is better too, can see better.     PHQ9/GAD7:         Past Medical History:   Diagnosis Date    Disease of thyroid gland     Glaucoma     Hypertension         Current Medications  Current Outpatient Medications   Medication  Instructions    aspirin 81 mg, oral, Daily    atorvastatin (LIPITOR) 20 mg, oral, Daily    brimonidine (AlphaGAN) 0.2 % ophthalmic solution 1 drop, Left Eye, 2 times daily    dorzolamide-timoloL (Cosopt) 22.3-6.8 mg/mL ophthalmic solution 1 drop, Left Eye, 2 times daily    DULoxetine 40 mg, oral, Daily RT    fluticasone (Flonase) 50 mcg/actuation nasal spray 2 sprays, Each Nostril, Daily    hydrOXYzine HCL (ATARAX) 20 mg, oral, Nightly PRN    levothyroxine (SYNTHROID, LEVOXYL) 75 mcg, oral, Daily before breakfast    meclizine (ANTIVERT) 12.5 mg, oral, 2 times daily PRN    methazolAMIDE (NEPTAZANE) 50 mg, oral, 2 times daily    One Daily Multivitamin tablet 1 tablet, oral, Daily    oxymetazoline, PF, (Upneeq, PF,) 0.1 % dropperette 1 drop, ophthalmic (eye), Daily RT    pantoprazole (PROTONIX) 40 mg, oral, As needed    prednisoLONE acetate (Pred-Forte) 1 % ophthalmic suspension 1 drop, Left Eye, Daily    valsartan (DIOVAN) 80 mg, oral, Daily        Allergies  No Known Allergies     Past Surgical History:   Procedure Laterality Date    SPINE SURGERY       No family history on file.  Social History     Tobacco Use    Smoking status: Never    Smokeless tobacco: Never   Vaping Use    Vaping Use: Never used   Substance Use Topics    Alcohol use: Yes     Comment: soc    Drug use: Never     Tobacco Use: Low Risk  (3/7/2024)    Patient History     Smoking Tobacco Use: Never     Smokeless Tobacco Use: Never     Passive Exposure: Not on file        ROS  All pertinent positive symptoms are included in the history of present illness.  All other systems have been reviewed and are negative and noncontributory to this patient's current ailments.    VITAL SIGNS  Vitals:    03/07/24 1419   BP: 122/80   Pulse: 75   Temp: 35.6 °C (96 °F)   SpO2: 95%     Vitals:    03/07/24 1419   Weight: 75.8 kg (167 lb)      Body mass index is 27.82 kg/m².     PHYSICAL EXAM  GENERAL APPEARANCE: well nourished, well developed, looks like stated age, in  no acute distress, not ill or tired appearing, conversing well.   HEENT: no trauma, normocephalic.   NECK: supple without rigidity, no neck mass was observed.   LUNGS: good chest wall expansion. In no acute respiratory distress.   EXTREMITIES: moving all extremities equally. Large soft tissue mass medially along right knee joint.   SKIN: normal skin color and pigmentation, without rash.   NEUROLOGIC EXAM: CN II-XII grossly intact, antalgic gait  PSYCH: mood and affect appropriate; alert and oriented to time, place, person; no difficulty with speech or language.     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  Better numbers even though you are only on the valsartan.  Plan to stay off of the amlodipine.    Measure blood pressure 3 days a week and bring the numbers to the cardiologist appt.    In the meantime come for a nurse visit with your blood pressure machine for us to check the calibration. Share your recent numbers at that time and I will see if we need to add any more medicine.     Continue with physical therapy for your ear and balance, this seems to be helping.  The dizziness has likely been related to some loose crystals in your ears and the eye lids obscuring your vision. Pausing physical therapy until your knee is better.  Plan to see the plastic surgeon, the tissue pressure is too much and hurting you. Likely needs surgical removal.     The spine or pain doctor you see soon may help with treatment for your back that can make the feeling of coldness in your leg go away.     Plan to try a higher dose of Duloxetine to see if this helps with the burning or cold feeling in your leg / pain.       Patient Care Team:  Jeferson Tamayo DO as PCP - General (Family Medicine)    Jeferson Tamayo DO   03/07/24   2:55 PM

## 2024-03-07 NOTE — PATIENT INSTRUCTIONS
Problem List Items Addressed This Visit             ICD-10-CM    Essential hypertension, benign I10    Dizziness R42    Spinal stenosis, lumbar region with neurogenic claudication M48.062    Relevant Medications    DULoxetine 40 mg DR capsule     Other Visit Diagnoses         Codes    Hypotension, unspecified hypotension type    -  Primary I95.9            Additional Visit Plans:  Blood pressure and heart rate look good today.  Better numbers even though you are only on the valsartan.  Plan to stay off of the amlodipine.    Measure blood pressure 3 days a week and bring the numbers to the cardiologist appt.    In the meantime come for a nurse visit with your blood pressure machine for us to check the calibration. Share your recent numbers at that time and I will see if we need to add any more medicine.     Continue with physical therapy for your ear and balance, this seems to be helping.  The dizziness has likely been related to some loose crystals in your ears and the eye lids obscuring your vision. Pausing physical therapy until your knee is better. Plan to see the plastic surgeon, the tissue pressure is too much and hurting you. Likely needs surgical removal.     The spine or pain doctor you see soon may help with treatment for your back that can make the feeling of coldness in your leg go away.     Plan to try a higher dose of Duloxetine to see if this helps with the burning or cold feelingi n y our leg / pain.       Patient Care Team:  Jeferson Tamayo DO as PCP - General (Family Medicine)    Jeferson Tamayo DO   03/07/24   2:55 PM

## 2024-03-08 ENCOUNTER — APPOINTMENT (OUTPATIENT)
Dept: PHYSICAL THERAPY | Facility: CLINIC | Age: 89
End: 2024-03-08
Payer: MEDICAID

## 2024-03-15 ENCOUNTER — APPOINTMENT (OUTPATIENT)
Dept: PHYSICAL THERAPY | Facility: CLINIC | Age: 89
End: 2024-03-15
Payer: MEDICAID

## 2024-03-21 ENCOUNTER — CLINICAL SUPPORT (OUTPATIENT)
Dept: PRIMARY CARE | Facility: CLINIC | Age: 89
End: 2024-03-21
Payer: MEDICAID

## 2024-03-21 DIAGNOSIS — I10 ESSENTIAL HYPERTENSION, BENIGN: ICD-10-CM

## 2024-03-21 NOTE — PROGRESS NOTES
BP check  Pt machine: 155/68  Office machine: 130/74  BP numbers copied and given to PCP for review

## 2024-03-22 ENCOUNTER — APPOINTMENT (OUTPATIENT)
Dept: PHYSICAL THERAPY | Facility: CLINIC | Age: 89
End: 2024-03-22
Payer: MEDICAID

## 2024-03-28 ENCOUNTER — APPOINTMENT (OUTPATIENT)
Dept: PHYSICAL THERAPY | Facility: CLINIC | Age: 89
End: 2024-03-28
Payer: MEDICAID

## 2024-04-02 ENCOUNTER — APPOINTMENT (OUTPATIENT)
Dept: PHYSICAL THERAPY | Facility: CLINIC | Age: 89
End: 2024-04-02
Payer: MEDICAID

## 2024-04-04 ENCOUNTER — OFFICE VISIT (OUTPATIENT)
Dept: PRIMARY CARE | Facility: CLINIC | Age: 89
End: 2024-04-04
Payer: MEDICAID

## 2024-04-04 VITALS
BODY MASS INDEX: 33.83 KG/M2 | OXYGEN SATURATION: 96 % | HEIGHT: 59 IN | SYSTOLIC BLOOD PRESSURE: 130 MMHG | WEIGHT: 167.8 LBS | DIASTOLIC BLOOD PRESSURE: 82 MMHG | TEMPERATURE: 97.4 F | HEART RATE: 60 BPM

## 2024-04-04 DIAGNOSIS — M96.1 POST LAMINECTOMY SYNDROME: ICD-10-CM

## 2024-04-04 DIAGNOSIS — R42 VERTIGO: ICD-10-CM

## 2024-04-04 DIAGNOSIS — R20.2 PARESTHESIAS: ICD-10-CM

## 2024-04-04 DIAGNOSIS — E75.6 ABNORMAL DEPOSITS OF LIPID: ICD-10-CM

## 2024-04-04 DIAGNOSIS — M48.062 SPINAL STENOSIS, LUMBAR REGION WITH NEUROGENIC CLAUDICATION: ICD-10-CM

## 2024-04-04 DIAGNOSIS — I10 ESSENTIAL HYPERTENSION, BENIGN: Primary | ICD-10-CM

## 2024-04-04 DIAGNOSIS — G89.29 OTHER CHRONIC PAIN: ICD-10-CM

## 2024-04-04 DIAGNOSIS — F51.04 PSYCHOPHYSIOLOGICAL INSOMNIA: ICD-10-CM

## 2024-04-04 DIAGNOSIS — R42 DIZZINESS: ICD-10-CM

## 2024-04-04 PROCEDURE — 3079F DIAST BP 80-89 MM HG: CPT | Performed by: FAMILY MEDICINE

## 2024-04-04 PROCEDURE — 99214 OFFICE O/P EST MOD 30 MIN: CPT | Performed by: FAMILY MEDICINE

## 2024-04-04 PROCEDURE — 1160F RVW MEDS BY RX/DR IN RCRD: CPT | Performed by: FAMILY MEDICINE

## 2024-04-04 PROCEDURE — 1126F AMNT PAIN NOTED NONE PRSNT: CPT | Performed by: FAMILY MEDICINE

## 2024-04-04 PROCEDURE — 3075F SYST BP GE 130 - 139MM HG: CPT | Performed by: FAMILY MEDICINE

## 2024-04-04 PROCEDURE — 1159F MED LIST DOCD IN RCRD: CPT | Performed by: FAMILY MEDICINE

## 2024-04-04 RX ORDER — DULOXETIN HYDROCHLORIDE 60 MG/1
60 CAPSULE, DELAYED RELEASE ORAL
Qty: 90 CAPSULE | Refills: 1 | Status: SHIPPED | OUTPATIENT
Start: 2024-04-04 | End: 2024-12-30

## 2024-04-04 RX ORDER — HYDROXYZINE HYDROCHLORIDE 10 MG/1
20 TABLET, FILM COATED ORAL NIGHTLY PRN
Qty: 60 TABLET | Refills: 1 | Status: SHIPPED | OUTPATIENT
Start: 2024-04-04 | End: 2024-06-03

## 2024-04-04 RX ORDER — VALSARTAN 160 MG/1
160 TABLET ORAL DAILY
Qty: 90 TABLET | Refills: 0 | Status: SHIPPED | OUTPATIENT
Start: 2024-04-04 | End: 2024-07-03

## 2024-04-04 ASSESSMENT — LIFESTYLE VARIABLES
HOW OFTEN DO YOU HAVE SIX OR MORE DRINKS ON ONE OCCASION: NEVER
HOW MANY STANDARD DRINKS CONTAINING ALCOHOL DO YOU HAVE ON A TYPICAL DAY: PATIENT DOES NOT DRINK
SKIP TO QUESTIONS 9-10: 1
HOW OFTEN DO YOU HAVE A DRINK CONTAINING ALCOHOL: NEVER
AUDIT-C TOTAL SCORE: 0

## 2024-04-04 ASSESSMENT — PATIENT HEALTH QUESTIONNAIRE - PHQ9
1. LITTLE INTEREST OR PLEASURE IN DOING THINGS: NOT AT ALL
2. FEELING DOWN, DEPRESSED OR HOPELESS: NOT AT ALL
SUM OF ALL RESPONSES TO PHQ9 QUESTIONS 1 AND 2: 0

## 2024-04-04 ASSESSMENT — PAIN SCALES - GENERAL: PAINLEVEL: 0-NO PAIN

## 2024-04-04 NOTE — PATIENT INSTRUCTIONS
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    Can consider seeing Dr. Roman upstairs about other pain treatment options like implants and injections. Referral placed    Otherwise continue the cream, increase Duloxetine to 60mg daily, and increase valsartan to 160mg daily. To offset any high blood pressure from the Duloxetine.     Call me if home blood pressure on your machine keeps being below 120/70. This might be too low for you.     Start 20mg hydroxyzine at night to help you sleep.     Follow up in 2-3 months.     Patient Care Team:  Jeferson Tamayo DO as PCP - General (Family Medicine)    Jeferson Tamayo DO   04/04/24   2:18 PM

## 2024-04-04 NOTE — PROGRESS NOTES
Outpatient Visit Note    Chief Complaint   Patient presents with    Follow-up     Follow up on legs. Pt states her legs still feel the same way.       HPI:  Ayesha Linton is a 88 y.o. female here for medication follow-up.    She is with her  who helped to translate.    Continues to use 10 mg hydroxyzine at night for her insomnia with still some trouble falling asleep or staying asleep.  Has not tried to use 20 mg.    Since her last visit with me she has seen plastic surgery about the extra tissue around her thigh.  This causes a lot of knee discomfort for her and inability to easily participate in physical therapy.  Gabapentin after 2 weeks provided no relief.  At her last visit I recommended a higher dose of her duloxetine to see if this helps with burning or cold feelings in her legs/pain. The topical cream works great on the sciatic area. Will try it on her legs as needed.     She saw the plastic surgeon and was diagnosed with a lipoma with recommendations for surgical removal.  With regards to the Cymbalta she states that the 40mg increase really helped. She does want to try 60mg and watch the blood pressures.     She also had a follow-up with the vascular doctor.  She was reassured that her cold extremities are not due to poor circulation and the studies were normal.  Feet are warm on exam.  Suspect neuropathy as source of altered sensation.  Recommended she see neurology. Has a nerve test scheduled for tomorrow.     Continues to participate in vestibular rehab for her dizziness with improvements?    She was going to see a pain management/spinal doctor on 3/19/2024 for further assessment.  She had the appointment and was diagnosed with lower extremity claudication bilaterally with an unclear cause, and postlaminectomy syndrome.  There is mention that as per spine surgeon it may be residual and permanent despite surgical relief of the stenosis that was previously present.  Plan is to check an  MRI of the lumbar spine to rule out junctional stenosis after laminectomy.  There was some mention about a benefit of going from 20 mg to 40 mg of Cymbalta and that she could consider an increase to 60 mg.    We have been keeping a close eye on her blood pressure numbers.  She did bring her machine for calibration on March 21 and her machine seem to read about 20 units high for SBP.  Our office number was 130/74.  She sees cardiology on April 30. Says BP was 163/98 at home today. Says she took 160mg Valsartan today at 11am.     PHQ9/GAD7:         Past Medical History:   Diagnosis Date    Disease of thyroid gland     Glaucoma     Hypertension         Current Medications  Current Outpatient Medications   Medication Instructions    aspirin 81 mg, oral, Daily    atorvastatin (LIPITOR) 20 mg, oral, Daily    dorzolamide-timoloL (Cosopt) 22.3-6.8 mg/mL ophthalmic solution 1 drop, Left Eye, 2 times daily    DULoxetine (CYMBALTA) 60 mg, oral, Daily RT    fluticasone (Flonase) 50 mcg/actuation nasal spray 2 sprays, Each Nostril, Daily    hydrOXYzine HCL (ATARAX) 20 mg, oral, Nightly PRN    levothyroxine (SYNTHROID, LEVOXYL) 75 mcg, oral, Daily before breakfast    meclizine (ANTIVERT) 12.5 mg, oral, 2 times daily PRN    methazolAMIDE (NEPTAZANE) 50 mg, oral, 2 times daily    One Daily Multivitamin tablet 1 tablet, oral, Daily    oxymetazoline, PF, (Upneeq, PF,) 0.1 % dropperette 1 drop, ophthalmic (eye), Daily RT    pantoprazole (PROTONIX) 40 mg, oral, As needed    prednisoLONE acetate (Pred-Forte) 1 % ophthalmic suspension 1 drop, Left Eye, Daily    valsartan (DIOVAN) 160 mg, oral, Daily        Allergies  No Known Allergies     Past Surgical History:   Procedure Laterality Date    SPINE SURGERY       No family history on file.  Social History     Tobacco Use    Smoking status: Never     Passive exposure: Never    Smokeless tobacco: Never   Vaping Use    Vaping Use: Never used   Substance Use Topics    Alcohol use: Not  Currently     Comment: soc    Drug use: Never     Tobacco Use: Low Risk  (4/4/2024)    Patient History     Smoking Tobacco Use: Never     Smokeless Tobacco Use: Never     Passive Exposure: Never        ROS  All pertinent positive symptoms are included in the history of present illness.  All other systems have been reviewed and are negative and noncontributory to this patient's current ailments.    VITAL SIGNS  Vitals:    04/04/24 1348   BP: 130/82   Pulse: 60   Temp: 36.3 °C (97.4 °F)   SpO2: 96%     Vitals:    04/04/24 1348   Weight: 76.1 kg (167 lb 12.8 oz)      Body mass index is 33.89 kg/m².     PHYSICAL EXAM  GENERAL APPEARANCE: well nourished, well developed, looks like stated age, in no acute distress, not ill or tired appearing, conversing well.   HEENT: no trauma, normocephalic.   NECK: supple without rigidity, no neck mass was observed.   LUNGS: good chest wall expansion. In no acute respiratory distress.   EXTREMITIES: moving all extremities equally with no edema.   SKIN: normal skin color and pigmentation, without rash.   NEUROLOGIC EXAM: CN II-XII grossly intact, normal gait.   PSYCH: improved mood and affect appropriate; alert and oriented to time, place, person; no difficulty with speech or language.     Counseling:       Medication education:           Education:  The patient is counseled regarding potential side-effects of all new medications          Understanding:  Patient expressed understanding of information conveyed today          Adherence:  No barriers to adherence identified     Assessment/Plan   Problem List Items Addressed This Visit             ICD-10-CM    Essential hypertension, benign - Primary I10    Relevant Medications    valsartan (Diovan) 160 mg tablet    Dizziness R42    Relevant Medications    hydrOXYzine HCL (Atarax) 10 mg tablet    Spinal stenosis, lumbar region with neurogenic claudication M48.062    Relevant Medications    DULoxetine (Cymbalta) 60 mg DR capsule     Other  Visit Diagnoses         Codes    Psychophysiological insomnia     F51.04    Relevant Medications    hydrOXYzine HCL (Atarax) 10 mg tablet    Other chronic pain     G89.29    Abnormal deposits of lipid     E75.6    Paresthesias     R20.2    Post laminectomy syndrome     M96.1    Relevant Orders    Referral to Pain Medicine    Vertigo     R42    Relevant Medications    hydrOXYzine HCL (Atarax) 10 mg tablet            Additional Visit Plans:  Like the surgeon and pain doctor said, this may be permanent damage.    Can consider seeing Dr. Roman upstairs about other pain treatment options like implants and injections. Referral placed    Otherwise continue the cream, increase Duloxetine to 60mg daily, and increase valsartan to 160mg daily to offset any high blood pressure from the Duloxetine.     Call me if home blood pressure on your machine keeps being below 120/70. This might be too low for you.     Start 20mg hydroxyzine at night to help you sleep.     Follow up in 2-3 months.     Patient Care Team:  Jeferson Tamayo DO as PCP - General (Family Medicine)    Jeferson Tamayo DO   04/05/24   6:53 AM

## 2024-04-09 ENCOUNTER — APPOINTMENT (OUTPATIENT)
Dept: PHYSICAL THERAPY | Facility: CLINIC | Age: 89
End: 2024-04-09
Payer: MEDICAID

## 2024-07-10 ENCOUNTER — TELEPHONE (OUTPATIENT)
Dept: PRIMARY CARE | Facility: CLINIC | Age: 89
End: 2024-07-10
Payer: MEDICAID

## 2024-07-10 NOTE — TELEPHONE ENCOUNTER
[facility]  called  stating PT does not speak english so she is calling with BP readings.  PT's BP today is 190/114, pulse 87. Advised that PT go to ER right away.

## 2024-07-11 NOTE — TELEPHONE ENCOUNTER
We have been trying to keep an eye on her blood pressures which have been fluctuating for us so we brought cardiology on board. her machine seem to read about 20 units high for SBP when we checked it. Please let her know I recommend she see her cardiologist for follow up from the ER visit to get more help with managing the blood pressure and her medicine for this. Schedule with cardiology as soon as possible.

## 2024-07-19 ENCOUNTER — TELEPHONE (OUTPATIENT)
Dept: PRIMARY CARE | Facility: CLINIC | Age: 89
End: 2024-07-19
Payer: MEDICAID

## 2024-07-19 NOTE — TELEPHONE ENCOUNTER
Spoke with patient daughter and advised that patient already has an appt scheduled for next Thursday. They will keep that appt as in person. PL

## 2024-07-19 NOTE — TELEPHONE ENCOUNTER
Daughter called to get virtual appointment for mother to discuss the recommendations from when she wore heart monitor and her blood pressure has been elevated.  Please contact her , Autumn @ [phone] or her daugher, Rolanda @ [phone]

## 2024-07-23 ENCOUNTER — APPOINTMENT (OUTPATIENT)
Dept: PRIMARY CARE | Facility: CLINIC | Age: 89
End: 2024-07-23
Payer: MEDICAID

## 2024-07-25 ENCOUNTER — OFFICE VISIT (OUTPATIENT)
Dept: PRIMARY CARE | Facility: CLINIC | Age: 89
End: 2024-07-25
Payer: MEDICAID

## 2024-07-25 ENCOUNTER — HOME HEALTH ADMISSION (OUTPATIENT)
Dept: HOME HEALTH SERVICES | Facility: HOME HEALTH | Age: 89
End: 2024-07-25
Payer: MEDICAID

## 2024-07-25 ENCOUNTER — DOCUMENTATION (OUTPATIENT)
Dept: HOME HEALTH SERVICES | Facility: HOME HEALTH | Age: 89
End: 2024-07-25

## 2024-07-25 VITALS
RESPIRATION RATE: 18 BRPM | DIASTOLIC BLOOD PRESSURE: 80 MMHG | SYSTOLIC BLOOD PRESSURE: 118 MMHG | HEART RATE: 80 BPM | TEMPERATURE: 97 F | OXYGEN SATURATION: 95 % | WEIGHT: 168.2 LBS | BODY MASS INDEX: 33.91 KG/M2 | HEIGHT: 59 IN

## 2024-07-25 DIAGNOSIS — R09.89 LABILE BLOOD PRESSURE: ICD-10-CM

## 2024-07-25 DIAGNOSIS — I10 ESSENTIAL HYPERTENSION, BENIGN: Primary | ICD-10-CM

## 2024-07-25 PROCEDURE — 1160F RVW MEDS BY RX/DR IN RCRD: CPT | Performed by: FAMILY MEDICINE

## 2024-07-25 PROCEDURE — 99214 OFFICE O/P EST MOD 30 MIN: CPT | Performed by: FAMILY MEDICINE

## 2024-07-25 PROCEDURE — 3079F DIAST BP 80-89 MM HG: CPT | Performed by: FAMILY MEDICINE

## 2024-07-25 PROCEDURE — 1159F MED LIST DOCD IN RCRD: CPT | Performed by: FAMILY MEDICINE

## 2024-07-25 PROCEDURE — 1125F AMNT PAIN NOTED PAIN PRSNT: CPT | Performed by: FAMILY MEDICINE

## 2024-07-25 PROCEDURE — 1036F TOBACCO NON-USER: CPT | Performed by: FAMILY MEDICINE

## 2024-07-25 PROCEDURE — 3074F SYST BP LT 130 MM HG: CPT | Performed by: FAMILY MEDICINE

## 2024-07-25 ASSESSMENT — COLUMBIA-SUICIDE SEVERITY RATING SCALE - C-SSRS
2. HAVE YOU ACTUALLY HAD ANY THOUGHTS OF KILLING YOURSELF?: NO
1. IN THE PAST MONTH, HAVE YOU WISHED YOU WERE DEAD OR WISHED YOU COULD GO TO SLEEP AND NOT WAKE UP?: NO
6. HAVE YOU EVER DONE ANYTHING, STARTED TO DO ANYTHING, OR PREPARED TO DO ANYTHING TO END YOUR LIFE?: NO

## 2024-07-25 ASSESSMENT — ENCOUNTER SYMPTOMS
DEPRESSION: 0
OCCASIONAL FEELINGS OF UNSTEADINESS: 0
LOSS OF SENSATION IN FEET: 0

## 2024-07-25 ASSESSMENT — PATIENT HEALTH QUESTIONNAIRE - PHQ9
1. LITTLE INTEREST OR PLEASURE IN DOING THINGS: NOT AT ALL
SUM OF ALL RESPONSES TO PHQ9 QUESTIONS 1 & 2: 0
2. FEELING DOWN, DEPRESSED OR HOPELESS: NOT AT ALL

## 2024-07-25 ASSESSMENT — PAIN SCALES - GENERAL: PAINLEVEL: 2

## 2024-07-25 NOTE — PATIENT INSTRUCTIONS
Problem List Items Addressed This Visit             ICD-10-CM    Essential hypertension, benign - Primary I10       Additional Visit Plans:  Our machine 118/80. Left arm.  Your machine right after ours 160/99, upper arm machine. Left arm.     Please discuss your machine with the cardiologist. Perhaps an automated blood pressure machine is not the right fit for you.     We have a good number here today and at your last cardiology appt. Your machine is reading high today - its calibration is very off but we cannot find a consistent difference.     Maybe ask your insurance if they provide services or equipment for having someone read your blood pressure periodically at home with a manual machine.     Plan to see Dr. Choi - cardiologist. Your blood pressures fluctuate a lot and we cannot get consistency with readings to determine calibration or medicine management. For now, stay only on 120mg twice a day of losartan.     Do not worry about the metoprolol for now. Ask Dr. Choi about this.     You are getting tired from the constant adjustments in the medicines. Your stress and worry is adding to the numbers too.     Patient Care Team:  Jeferson Tamayo DO as PCP - General (Family Medicine)    Jeferson Tamayo DO   07/25/24   1:20 PM

## 2024-07-25 NOTE — PROGRESS NOTES
Outpatient Visit Note    Chief Complaint   Patient presents with    Hospital Follow-up    home BP cuff 160/99       HPI:  Ayesha Linton is a 89 y.o. female here for ER follow-up. She is with the  today.     We are following up on her blood pressure and ER visit today.  She returns on 8-24 to discuss the rest of her medications     Per chart review she was seen on 7/10/2024 at the [facility] ER with elevated blood pressures and headache, dizziness and feeling like she was having tapping around her head.    It is difficult to access visit notes but I do see blood pressure was 181/95 with heart rate of 60.  She was sent home on an increased dose of valsartan, 160 mg twice daily.  CBC with no anemia or elevated white count.  BMP essentially unremarkable.    Since the ER visit she saw the plastic surgeon the excessive tissue around her knees on 7/15/2024.  The plastic surgeon did not feel that the collection of fat explains her bilateral knee pain and recommended she see an orthopedic surgeon for further evaluation.  A referral was placed.    She went to an  on 7/15/24 for her HTN.  They were able to organize for seeing cardiology the next day rather than in September for further evaluation.  She saw Dr. Dave Salazar at the [facility] heart and vascular [place].  Blood pressure at that visit was 116/83 with heart rate of 65. Zio tracings reviewed.  A few runs of nonsustained supraventricular tachycardia.  Echo with normal LV function.  Patient elected to use low-dose metoprolol on an as-needed basis for treatment of her infrequent palpitations.  Increase in losartan created a significant drop of blood pressures ranging 180-190 now down to 110 and with that she was feeling more tired.  Losartan dose was adjusted to 120 mg twice daily.  Follow-up in 3 months.    Our machine 118/80. Left arm.  Her machine right after 160/99, upper arm machine. Left arm.     She says her head hurts. Has  been on the 120mg dose last week but she said home numbers were high so she switched back to 160mg on her own. Today was the first day going back to 120mg.     Says she feels fine and checks BP out of routine and then sees that it is high. Does not have metoprolol.     Rechecked again:  Her machine 146/110 HR 69  Ours 152/91  Hers again 162/110    PHQ9/GAD7:         Patient Active Problem List    Diagnosis Date Noted    Unsteadiness on feet 02/23/2024    Mixed hyperlipidemia 12/14/2023    Essential hypertension, benign 03/13/2023    Shortness of breath 03/13/2023    Hypothyroidism 02/03/2023    Muscle weakness (generalized) 11/16/2022    Spinal stenosis, lumbar region with neurogenic claudication 08/26/2022    Dizziness 07/31/2022    Chronic bilateral low back pain without sciatica 05/24/2022        Past Medical History:   Diagnosis Date    Disease of thyroid gland     Glaucoma     Hypertension         Current Medications  Current Outpatient Medications   Medication Instructions    aspirin 81 mg, oral, Daily    atorvastatin (LIPITOR) 20 mg, oral, Daily    dorzolamide-timoloL (Cosopt) 22.3-6.8 mg/mL ophthalmic solution 1 drop, Left Eye, 2 times daily    DULoxetine (CYMBALTA) 60 mg, oral, Daily RT    hydrOXYzine HCL (ATARAX) 20 mg, oral, Nightly PRN    levothyroxine (SYNTHROID, LEVOXYL) 75 mcg, oral, Daily before breakfast    meclizine (ANTIVERT) 12.5 mg, oral, 2 times daily PRN    methazolAMIDE (NEPTAZANE) 50 mg, oral, 2 times daily    One Daily Multivitamin tablet 1 tablet, oral, Daily    oxymetazoline, PF, (Upneeq, PF,) 0.1 % dropperette 1 drop, ophthalmic (eye), Daily RT    pantoprazole (PROTONIX) 40 mg, oral, As needed    prednisoLONE acetate (Pred-Forte) 1 % ophthalmic suspension 1 drop, Left Eye, Daily    valsartan (DIOVAN) 160 mg, oral, Daily        Allergies  No Known Allergies     Past Surgical History:   Procedure Laterality Date    SPINE SURGERY       No family history on file.  Social History     Tobacco  Use    Smoking status: Never     Passive exposure: Never    Smokeless tobacco: Never   Vaping Use    Vaping status: Never Used   Substance Use Topics    Alcohol use: Not Currently     Comment: soc    Drug use: Never     Tobacco Use: Low Risk  (7/25/2024)    Patient History     Smoking Tobacco Use: Never     Smokeless Tobacco Use: Never     Passive Exposure: Never        ROS  All pertinent positive symptoms are included in the history of present illness.  All other systems have been reviewed and are negative and noncontributory to this patient's current ailments.    VITAL SIGNS  Vitals:    07/25/24 1306   BP: 118/80   Pulse: 80   Resp: 18   Temp: 36.1 °C (97 °F)   SpO2: 95%     Vitals:    07/25/24 1306   Weight: 76.3 kg (168 lb 3.2 oz)      Body mass index is 33.97 kg/m².     PHYSICAL EXAM  GENERAL APPEARANCE: well nourished, well developed, looks like stated age, in no acute distress, not ill or tired appearing, conversing well.   HEENT: no trauma, normocephalic.   NECK: supple without rigidity, no neck mass was observed.   LUNGS: good chest wall expansion. In no acute respiratory distress.   EXTREMITIES: moving all extremities equally with no edema.   SKIN: normal skin color and pigmentation, without rash.   NEUROLOGIC EXAM: CN II-XII grossly intact, walking with a cane  PSYCH: mood and affect appropriate; alert and oriented to time, place, person; no difficulty with speech. Needs translation services        Assessment/Plan   Problem List Items Addressed This Visit             ICD-10-CM    Essential hypertension, benign - Primary I10       Additional Visit Plans:  Our machine 118/80. Left arm.  Your machine right after ours 160/99, upper arm machine. Left arm.     Please discuss your machine with the cardiologist. Perhaps an automated blood pressure machine is not the right fit for you.     We have a good number here today and at your last cardiology appt. Your machine is reading high today - its calibration is  very off but we cannot find a consistent difference.     Maybe ask your insurance if they provide services or equipment for having someone read your blood pressure periodically at home with a manual machine.     Plan to see Dr. Choi - cardiologist. Your blood pressures fluctuate a lot and we cannot get consistency with readings to determine calibration or medicine management. For now, stay only on 120mg twice a day of valsartan.     Do not worry about the metoprolol for now. Ask Dr. Choi about this.     You are getting tired from the constant adjustments in the medicines. Your stress and worry is adding to the numbers too.     Patient Care Team:  Jeferson Tamayo DO as PCP - General (Family Medicine)    Jeferson Tamayo DO   07/25/24   1:20 PM

## 2024-07-25 NOTE — HH CARE COORDINATION
Home Care received a Referral for Nursing. We have processed the referral for a Start of Care on 0-27-24, 24-48 HOURS.     If you have any questions or concerns, please feel free to contact us at [phone]. Follow the prompts, enter your five digit zip code, and you will be directed to your care team on [facility] 3.

## 2024-07-27 ENCOUNTER — HOME CARE VISIT (OUTPATIENT)
Dept: HOME HEALTH SERVICES | Facility: HOME HEALTH | Age: 89
End: 2024-07-27
Payer: MEDICAID

## 2024-07-27 VITALS
HEART RATE: 67 BPM | OXYGEN SATURATION: 97 % | RESPIRATION RATE: 18 BRPM | BODY MASS INDEX: 33.33 KG/M2 | SYSTOLIC BLOOD PRESSURE: 130 MMHG | WEIGHT: 165.34 LBS | HEIGHT: 59 IN | TEMPERATURE: 98 F | DIASTOLIC BLOOD PRESSURE: 90 MMHG

## 2024-07-27 PROCEDURE — G0299 HHS/HOSPICE OF RN EA 15 MIN: HCPCS

## 2024-07-27 ASSESSMENT — ACTIVITIES OF DAILY LIVING (ADL)
ENTERING_EXITING_HOME: MODERATE ASSIST
OASIS_M1830: 03

## 2024-07-27 ASSESSMENT — ENCOUNTER SYMPTOMS
DENIES PAIN: 1
APPETITE LEVEL: GOOD
CHANGE IN APPETITE: UNCHANGED

## 2024-07-30 ENCOUNTER — HOME CARE VISIT (OUTPATIENT)
Dept: HOME HEALTH SERVICES | Facility: HOME HEALTH | Age: 89
End: 2024-07-30
Payer: MEDICAID

## 2024-07-30 ENCOUNTER — TELEPHONE (OUTPATIENT)
Dept: PRIMARY CARE | Facility: CLINIC | Age: 89
End: 2024-07-30
Payer: MEDICAID

## 2024-07-30 VITALS
OXYGEN SATURATION: 99 % | RESPIRATION RATE: 18 BRPM | SYSTOLIC BLOOD PRESSURE: 136 MMHG | HEART RATE: 54 BPM | TEMPERATURE: 98.7 F | DIASTOLIC BLOOD PRESSURE: 74 MMHG

## 2024-07-30 DIAGNOSIS — R42 DIZZINESS: ICD-10-CM

## 2024-07-30 PROCEDURE — G0299 HHS/HOSPICE OF RN EA 15 MIN: HCPCS

## 2024-07-30 RX ORDER — MECLIZINE HCL 12.5 MG 12.5 MG/1
12.5 TABLET ORAL 2 TIMES DAILY PRN
Qty: 90 TABLET | Refills: 1 | Status: SHIPPED | OUTPATIENT
Start: 2024-07-30

## 2024-07-30 ASSESSMENT — ENCOUNTER SYMPTOMS
DENIES PAIN: 1
APPETITE LEVEL: GOOD
CHANGE IN APPETITE: UNCHANGED

## 2024-07-30 NOTE — TELEPHONE ENCOUNTER
Refill Request:    meclizine (Antivert) 12.5 mg tablet Take 1 tablet (12.5 mg) by mouth 2 times a day as needed for dizziness       # 30 day supply    Patient is out of this medication.     Pharmacy: [facility]    Last OV- 07-25-24-ED follow up    Next OV- 11-01-24- 3 month follow up

## 2024-08-01 ENCOUNTER — HOME CARE VISIT (OUTPATIENT)
Dept: HOME HEALTH SERVICES | Facility: HOME HEALTH | Age: 89
End: 2024-08-01
Payer: MEDICAID

## 2024-08-01 VITALS
SYSTOLIC BLOOD PRESSURE: 151 MMHG | TEMPERATURE: 98.9 F | OXYGEN SATURATION: 98 % | DIASTOLIC BLOOD PRESSURE: 98 MMHG | RESPIRATION RATE: 18 BRPM | HEART RATE: 68 BPM

## 2024-08-01 PROCEDURE — G0299 HHS/HOSPICE OF RN EA 15 MIN: HCPCS

## 2024-08-02 ENCOUNTER — APPOINTMENT (OUTPATIENT)
Dept: PRIMARY CARE | Facility: CLINIC | Age: 89
End: 2024-08-02
Payer: MEDICAID

## 2024-08-04 ASSESSMENT — ENCOUNTER SYMPTOMS
CHANGE IN APPETITE: UNCHANGED
DENIES PAIN: 1
APPETITE LEVEL: GOOD

## 2024-08-06 ENCOUNTER — HOME CARE VISIT (OUTPATIENT)
Dept: HOME HEALTH SERVICES | Facility: HOME HEALTH | Age: 89
End: 2024-08-06
Payer: MEDICAID

## 2024-08-06 VITALS
TEMPERATURE: 97.6 F | OXYGEN SATURATION: 99 % | HEART RATE: 75 BPM | DIASTOLIC BLOOD PRESSURE: 75 MMHG | RESPIRATION RATE: 16 BRPM | SYSTOLIC BLOOD PRESSURE: 140 MMHG

## 2024-08-06 PROCEDURE — G0299 HHS/HOSPICE OF RN EA 15 MIN: HCPCS

## 2024-08-06 ASSESSMENT — ENCOUNTER SYMPTOMS
APPETITE LEVEL: GOOD
LAST BOWEL MOVEMENT: 67058
DENIES PAIN: 1

## 2024-08-13 ENCOUNTER — HOME CARE VISIT (OUTPATIENT)
Dept: HOME HEALTH SERVICES | Facility: HOME HEALTH | Age: 89
End: 2024-08-13
Payer: MEDICAID

## 2024-08-13 VITALS
DIASTOLIC BLOOD PRESSURE: 90 MMHG | SYSTOLIC BLOOD PRESSURE: 140 MMHG | RESPIRATION RATE: 18 BRPM | OXYGEN SATURATION: 99 % | HEART RATE: 65 BPM | TEMPERATURE: 98.7 F

## 2024-08-13 PROCEDURE — G0299 HHS/HOSPICE OF RN EA 15 MIN: HCPCS

## 2024-08-15 ENCOUNTER — OFFICE VISIT (OUTPATIENT)
Dept: CARDIOLOGY | Facility: CLINIC | Age: 89
End: 2024-08-15
Payer: MEDICAID

## 2024-08-15 VITALS
DIASTOLIC BLOOD PRESSURE: 85 MMHG | WEIGHT: 167 LBS | HEART RATE: 57 BPM | BODY MASS INDEX: 33.73 KG/M2 | OXYGEN SATURATION: 97 % | SYSTOLIC BLOOD PRESSURE: 141 MMHG

## 2024-08-15 DIAGNOSIS — R09.89 LABILE BLOOD PRESSURE: ICD-10-CM

## 2024-08-15 DIAGNOSIS — I10 ESSENTIAL HYPERTENSION, BENIGN: ICD-10-CM

## 2024-08-15 PROCEDURE — 3079F DIAST BP 80-89 MM HG: CPT | Performed by: INTERNAL MEDICINE

## 2024-08-15 PROCEDURE — 1126F AMNT PAIN NOTED NONE PRSNT: CPT | Performed by: INTERNAL MEDICINE

## 2024-08-15 PROCEDURE — 1159F MED LIST DOCD IN RCRD: CPT | Performed by: INTERNAL MEDICINE

## 2024-08-15 PROCEDURE — 1036F TOBACCO NON-USER: CPT | Performed by: INTERNAL MEDICINE

## 2024-08-15 PROCEDURE — 3077F SYST BP >= 140 MM HG: CPT | Performed by: INTERNAL MEDICINE

## 2024-08-15 PROCEDURE — 99204 OFFICE O/P NEW MOD 45 MIN: CPT | Performed by: INTERNAL MEDICINE

## 2024-08-15 PROCEDURE — 99214 OFFICE O/P EST MOD 30 MIN: CPT | Performed by: INTERNAL MEDICINE

## 2024-08-15 RX ORDER — METOPROLOL TARTRATE 25 MG/1
12.5 TABLET, FILM COATED ORAL 2 TIMES DAILY
COMMUNITY
End: 2024-08-15 | Stop reason: ALTCHOICE

## 2024-08-15 RX ORDER — VALSARTAN 160 MG/1
160 TABLET ORAL DAILY
Qty: 90 TABLET | Refills: 0 | Status: SHIPPED | OUTPATIENT
Start: 2024-08-15 | End: 2024-11-13

## 2024-08-15 RX ORDER — BRIMONIDINE TARTRATE 2 MG/ML
1 SOLUTION/ DROPS OPHTHALMIC 2 TIMES DAILY
COMMUNITY

## 2024-08-15 RX ORDER — HYDROCHLOROTHIAZIDE 25 MG/1
25 TABLET ORAL DAILY
Qty: 90 TABLET | Refills: 3 | Status: SHIPPED | OUTPATIENT
Start: 2024-08-15 | End: 2025-08-15

## 2024-08-15 RX ORDER — PROPRANOLOL HYDROCHLORIDE 10 MG/1
5 TABLET ORAL 2 TIMES DAILY
COMMUNITY
End: 2024-08-15 | Stop reason: ALTCHOICE

## 2024-08-15 ASSESSMENT — ENCOUNTER SYMPTOMS
OCCASIONAL FEELINGS OF UNSTEADINESS: 1
LOSS OF SENSATION IN FEET: 0
APPETITE LEVEL: GOOD
CHANGE IN APPETITE: UNCHANGED
DENIES PAIN: 1
DEPRESSION: 0

## 2024-08-15 ASSESSMENT — PAIN SCALES - GENERAL: PAINLEVEL: 0-NO PAIN

## 2024-08-15 NOTE — PATIENT INSTRUCTIONS
One month   Stop metoprolol and inderol (propanolol)  Valsartan once a day  Hydrochlorothiazide once a day  Take BP 3 times a week and bring in with her

## 2024-08-15 NOTE — ASSESSMENT & PLAN NOTE
Is a 11-year-old white female who is very difficult to history from but evidently she is having trouble with her blood pressure.  She does not seem to be compliant with her medications she does takes some and sometimes others she does not.  Will try to simplify her regime she is on metoprolol as well as propranolol we will stop both of those.  Will continue with her valsartan at once a day instead of twice a day and will also put her hydrochlorothiazide 25 mg a day.  She instructed take her blood pressure 3 times a week.  She has been taking it constantly specially does not feel well.  Will see her back in a month to see how blood pressure is doing she may need higher doses.

## 2024-08-15 NOTE — PROGRESS NOTES
Subjective      Chief Complaint   Patient presents with    <9>Essential hypertension, benign          This is a 89-year-old white female restlessly because of high blood pressure.  She speaks no English and is translated from her daughter.  Evidently she is having problem with her blood pressure.  She says her blood pressure is variable it was up and down she takes her blood pressure twice a day.  If it drips down to the 110 range she does not feel well.  She says in the 130 she feels much better.  She is not complain of any chest pain chest pressures or discomforts.  Does not complain symptoms of PND orthopnea.  She is not diabetic she is not a smoker there is no family's coronary artery disease she never had high cholesterol.  She never had rheumatic fever nor heart murmur.  Her EKG shows a normal sinus rhythm with a incomplete right bundle branch block pattern left anterior hemiblock and voltage criteria for left ventricular hypertrophy.           ROS     Past Surgical History:   Procedure Laterality Date    SPINE SURGERY          Active Ambulatory Problems     Diagnosis Date Noted    Chronic bilateral low back pain without sciatica 05/24/2022    Essential hypertension, benign 03/13/2023    Hypothyroidism 02/03/2023    Dizziness 07/31/2022    Muscle weakness (generalized) 11/16/2022    Shortness of breath 03/13/2023    Spinal stenosis, lumbar region with neurogenic claudication 08/26/2022    Mixed hyperlipidemia 12/14/2023    Unsteadiness on feet 02/23/2024     Resolved Ambulatory Problems     Diagnosis Date Noted    No Resolved Ambulatory Problems     Past Medical History:   Diagnosis Date    Disease of thyroid gland     Glaucoma     Hypertension         Visit Vitals  /85   Pulse 57   Wt 75.8 kg (167 lb)   SpO2 97%   BMI 33.73 kg/m²   Smoking Status Never   BSA 1.78 m²        Objective     Constitutional:       Appearance: Healthy appearance.   Eyes:      Pupils: Pupils are equal, round, and reactive to  "light.   Neck:      Vascular: JVD normal.   Pulmonary:      Breath sounds: Normal breath sounds.   Cardiovascular:      PMI at left midclavicular line. Normal rate. Regular rhythm. Normal S1. Normal S2.       Murmurs: There is no murmur.      No gallop.  No click. No rub.   Pulses:     Intact distal pulses.   Edema:     Peripheral edema absent.   Abdominal:      Palpations: Abdomen is soft.      Tenderness: There is no abdominal tenderness.   Musculoskeletal:      Extremities: No clubbing present.Skin:     General: Skin is warm and dry.   Neurological:      General: No focal deficit present.            Lab Review:         No results found for: \"CHOL\"  No results found for: \"HDL\"  No results found for: \"LDLCALC\"  No results found for: \"TRIG\"  No components found for: \"CHOLHDL\"     Assessment/Plan     Essential hypertension, benign  Is a 11-year-old white female who is very difficult to history from but evidently she is having trouble with her blood pressure.  She does not seem to be compliant with her medications she does takes some and sometimes others she does not.  Will try to simplify her regime she is on metoprolol as well as propranolol we will stop both of those.  Will continue with her valsartan at once a day instead of twice a day and will also put her hydrochlorothiazide 25 mg a day.  She instructed take her blood pressure 3 times a week.  She has been taking it constantly specially does not feel well.  Will see her back in a month to see how blood pressure is doing she may need higher doses.     "

## 2024-08-22 ENCOUNTER — HOME CARE VISIT (OUTPATIENT)
Dept: HOME HEALTH SERVICES | Facility: HOME HEALTH | Age: 89
End: 2024-08-22
Payer: MEDICAID

## 2024-08-22 VITALS — TEMPERATURE: 96.5 F | OXYGEN SATURATION: 96 %

## 2024-08-22 PROCEDURE — G0300 HHS/HOSPICE OF LPN EA 15 MIN: HCPCS

## 2024-08-23 ENCOUNTER — TELEPHONE (OUTPATIENT)
Dept: PRIMARY CARE | Facility: CLINIC | Age: 89
End: 2024-08-23
Payer: MEDICAID

## 2024-08-23 NOTE — TELEPHONE ENCOUNTER
"Please call patient and convey the following \"continue with instructions to take 25 mg of hydrochlorothiazide once a day with valsartan once a day.  Okay to check blood pressure after 30 minutes of resting once per day.  Write these numbers down and bring them to follow-up cardiology appointment on 9/18/2024.\"    Thank you  "

## 2024-08-25 SDOH — ECONOMIC STABILITY: GENERAL

## 2024-08-25 ASSESSMENT — ENCOUNTER SYMPTOMS
DENIES PAIN: 1
CHANGE IN APPETITE: UNCHANGED
LOSS OF SENSATION IN FEET: 0
DEPRESSION: 0
PERSON REPORTING PAIN: PATIENT
OCCASIONAL FEELINGS OF UNSTEADINESS: 0
APPETITE LEVEL: GOOD

## 2024-08-25 ASSESSMENT — ACTIVITIES OF DAILY LIVING (ADL): MONEY MANAGEMENT (EXPENSES/BILLS): INDEPENDENT

## 2024-08-29 ENCOUNTER — TELEPHONE (OUTPATIENT)
Dept: PRIMARY CARE | Facility: CLINIC | Age: 89
End: 2024-08-29
Payer: MEDICAID

## 2024-08-29 ENCOUNTER — HOME CARE VISIT (OUTPATIENT)
Dept: HOME HEALTH SERVICES | Facility: HOME HEALTH | Age: 89
End: 2024-08-29
Payer: MEDICAID

## 2024-08-29 VITALS
OXYGEN SATURATION: 98 % | RESPIRATION RATE: 18 BRPM | SYSTOLIC BLOOD PRESSURE: 117 MMHG | TEMPERATURE: 97.3 F | DIASTOLIC BLOOD PRESSURE: 83 MMHG | HEART RATE: 73 BPM

## 2024-08-29 PROCEDURE — G0299 HHS/HOSPICE OF RN EA 15 MIN: HCPCS

## 2024-08-29 NOTE — TELEPHONE ENCOUNTER
Spoke with Jessica and medication discussed of what we have and what patient is currently taking. She is going to contact the cardiologist also to clarify with him.  PL

## 2024-09-03 ASSESSMENT — ENCOUNTER SYMPTOMS
APPETITE LEVEL: GOOD
CHANGE IN APPETITE: UNCHANGED
DENIES PAIN: 1

## 2024-09-04 ENCOUNTER — EVALUATION (OUTPATIENT)
Dept: PHYSICAL THERAPY | Facility: CLINIC | Age: 89
End: 2024-09-04
Payer: MEDICAID

## 2024-09-04 DIAGNOSIS — M17.11 UNILATERAL PRIMARY OSTEOARTHRITIS, RIGHT KNEE: ICD-10-CM

## 2024-09-04 DIAGNOSIS — G89.29 CHRONIC PAIN OF RIGHT KNEE: Primary | ICD-10-CM

## 2024-09-04 DIAGNOSIS — M25.561 CHRONIC PAIN OF RIGHT KNEE: Primary | ICD-10-CM

## 2024-09-04 PROCEDURE — 97161 PT EVAL LOW COMPLEX 20 MIN: CPT | Mod: GP | Performed by: PHYSICAL THERAPIST

## 2024-09-04 NOTE — PROGRESS NOTES
Physical Therapy Orthopedic Examination Note    Patient Name: Ayesha Linton  MRN Number: [id number]  Initial Examination Date: 9/4/2024  Referring Clinician: No ref. provider found  Reason for Visit: Right knee pain    Insurance  Visit Number: 1   Approved Visits: 30   Certification/ POC Period: 9/4/24 - 10/30/24  Coverage: Payor: MEDICAID / Plan: MEDICAID / Product Type: *No Product type* /     Precautions/ History  Chronic low back pain; dizziness; weakness; spinal stenosis; unsteadiness on feet.    Problem List  Problem List Items Addressed This Visit             ICD-10-CM    Chronic pain of right knee - Primary M25.561, G89.29     Other Visit Diagnoses         Codes    Unilateral primary osteoarthritis, right knee     M17.11          Subjective  She has seen a lot of different doctors. Chronic knee pain, injections help. Last injection in her right knee a month ago. No knee pain right now. Issues with her balance because of eyes. Not like the head is spinning but some unsteadiness. Therapy helped with balance a little bit before. Lives alone. Takes elevator to get to her apartment. No pain in her right. Injection in the left knee. She struggles to bend her knee because of the mass of it. She has not fallen. Walks with the cane for distance walking. Stopped going to PT before because she could not walk.     Prior level of function: Independent.     Pain scale: Right knee  0/10 maximum in the last week    Inspection  Observation: Swelling in the right knee.  Gait: WNL, no deficits  Stairs: WNL, no deficits    Objective  Knee AROM Left Right Comments   Flexion 113° 110°    Extension (4)° (8)°      Hip Strength Testing Left Right Comments   Flexion 4/5 4/5    Abduction  4/5 4/5    Abduction with Extension 4/5 4/5      Assessment  Referred to PT for right knee, recent injection has completely resolved her right knee pain. She does not demonstrate any severe deficits in strength, minimal deficits in knee ROM. Skilled care  required to issue HEP to address knee ROM. Right knee pain interrupted a previous physical therapy POC for dizziness, she may resume care if physician approves, at that time a re-evaluation will be performed.     Problem List:   - limitations in knee AROM  - lack of appropriate HEP    Plan  Assess dizziness/ balance if approved. Bilateral knee stretches, seated heel slides and seated quad sets.    Planned interventions include: Patient education, Home exercise program, and Therapeutic exercise .     1 visit / Week for 8 weeks    Home Program  To be issues on future sessions.     Goals  - Increase right knee AROM to greater than or equal to 4-115° for improvements in functional mobility for stairs, sit to stand, tranfers into can and to reduce dysfunction contributing to pain.    Plan developed in agreement with patient.    Personal Factors Affecting Care: None  PT Clinical Presentation: Stable and/or uncomplicated characteristics  Rehab Potential: Excellent       Screening  Frequency  Date Last Completed   Spiritual and Cultural Beliefs   Screening  each visit or episode of care 8/15/2024   Falls Risk Screening  every ambulatory visit [unfilled]   Pain Screening  annually at primary care visit  8/15/2024   Domestic Violence screening  annually at primary care visit 8/15/2024   Elder Abuse Screening  annually at primary care visit 7/25/2024   Depression Screening  annually in the primary care setting 8/15/2024   Suicide Risk Screening  annually in the primary care setting 7/25/2024   Nutrition and Food Insecurity   Screening  at least annually at primary care visit     Key Learner  annually in the primary care setting 8/15/2024   Drug Screen  8/15/2024  1:14 PM   Alcohol Screen  8/15/2024  1:14 PM   Advance Directive  7/25/2024          Time Calculation  Start Time: 0147  Stop Time: 0228  Time Calculation (min): 41 min  PT Evaluation Time Entry  PT Evaluation (Low) Time Entry: 30

## 2024-09-05 ENCOUNTER — HOME CARE VISIT (OUTPATIENT)
Dept: HOME HEALTH SERVICES | Facility: HOME HEALTH | Age: 89
End: 2024-09-05
Payer: MEDICAID

## 2024-09-05 DIAGNOSIS — R42 VERTIGO: Primary | ICD-10-CM

## 2024-09-10 ENCOUNTER — HOME CARE VISIT (OUTPATIENT)
Dept: HOME HEALTH SERVICES | Facility: HOME HEALTH | Age: 89
End: 2024-09-10
Payer: MEDICAID

## 2024-09-12 ENCOUNTER — HOME CARE VISIT (OUTPATIENT)
Dept: HOME HEALTH SERVICES | Facility: HOME HEALTH | Age: 89
End: 2024-09-12
Payer: MEDICAID

## 2024-09-17 ASSESSMENT — ACTIVITIES OF DAILY LIVING (ADL)
OASIS_M1830: 00
HOME_HEALTH_OASIS: 01

## 2024-10-02 ENCOUNTER — APPOINTMENT (OUTPATIENT)
Dept: PHYSICAL THERAPY | Facility: CLINIC | Age: 89
End: 2024-10-02
Payer: MEDICAID

## 2024-10-09 ENCOUNTER — EVALUATION (OUTPATIENT)
Dept: PHYSICAL THERAPY | Facility: CLINIC | Age: 89
End: 2024-10-09
Payer: MEDICAID

## 2024-10-09 DIAGNOSIS — M25.561 CHRONIC PAIN OF RIGHT KNEE: Primary | ICD-10-CM

## 2024-10-09 DIAGNOSIS — R42 VERTIGO: ICD-10-CM

## 2024-10-09 DIAGNOSIS — R42 DIZZINESS: ICD-10-CM

## 2024-10-09 DIAGNOSIS — G89.29 CHRONIC PAIN OF RIGHT KNEE: Primary | ICD-10-CM

## 2024-10-09 PROCEDURE — 97112 NEUROMUSCULAR REEDUCATION: CPT | Mod: GP | Performed by: PHYSICAL THERAPIST

## 2024-10-09 PROCEDURE — 97164 PT RE-EVAL EST PLAN CARE: CPT | Mod: GP | Performed by: PHYSICAL THERAPIST

## 2024-10-09 NOTE — PROGRESS NOTES
Physical Therapy Re - Examination Note    Patient Name: Ayesha Linton  MRN Number: [id number]  Initial Examination Date: 10/9/2024  Referring Clinician: Jeferson Tamayo DO  Reason for Visit: Right knee pain    Insurance  Visit Number: 2   Approved Visits: 30   Certification/ POC Period: 9/4/24 - 10/30/24  Coverage: Payor: MEDICAID / Plan: MEDICAID / Product Type: *No Product type* /     Precautions/ History  Chronic low back pain; dizziness; weakness; spinal stenosis; unsteadiness on feet.    Problem List  Problem List Items Addressed This Visit             ICD-10-CM    Dizziness R42    Chronic pain of right knee - Primary M25.561, G89.29     Other Visit Diagnoses         Codes    Vertigo     R42          Subjective  Knees do not really hurt. Cramping/ muscles spasms in her legs. Cannot move her head quickly. Not that she falls but she feels unsteady. Uses the cane because of unsteadiness. Small morning routine to strengthen her legs.     Objective  LEFS = 25  DHI = 50  *outcome measure usefulness questionable given language barrier    Treatment  Therapeutic Exercise   - knee flexion/ extension in sitting   - seated forward bending   - sit to stand 2x5    Neuromuscular Reeducation   - corner balance turning head, feet together   - corner balance feet together eyes closed   - HEP printed/ issued/ discussed     Assessment  Re-examination performed to add unsteadiness to her plan of care. Patient was previously under PT care for dizziness and balance issues, which was interrupted as a result of severe right knee pain. Right knee pain addressed through injection.     Problem List:   - limitations in knee AROM  - lack of appropriate HEP  - dizziness  - reduction in postural control/stability  - unsteadiness while walking with head movements    Plan  Core exercises as well.     Home Exercise Program  Access Code: [email or web address]  URL: [email or web address]  Date: 10/09/2024  Prepared by: Gurpreet  Baljit    Exercises  - Seated Knee Flexion Extension AROM   - 3 x weekly - 2 sets - 10-20 reps  - Sit to Stand Without Arm Support  - 2 x weekly - 2 sets - 5-10 reps  - Corner Balance Feet Together: Eyes Open With Head Turns  - 3 x weekly - 1 min hold  - Corner Balance Feet Together With Eyes Closed  - 3 x weekly - 1 min hold  - Seated Flexion Stretch  - 3 x weekly - 10 reps - 10 sec hold    Planned interventions include: Patient education, Home exercise program, and Therapeutic exercise .     1 visit / Week for 8 weeks    Goals  - Increase right knee AROM to greater than or equal to 4-115° for improvements in functional mobility for stairs, sit to stand, tranfers into can and to reduce dysfunction contributing to pain.  - Full resolution of dizziness for improvements in quality of life.  - Patient to have no falls and negative test on condition 7 within [facility] SOP testing to improve postural control and balance.  - Patient to demonstrates capability of walking for 2 minutes without UE support and vertical/ horizontal at 100 bpm without unsteadiness.    Plan developed in agreement with patient.    Personal Factors Affecting Care: None  PT Clinical Presentation: Stable and/or uncomplicated characteristics  Rehab Potential: Excellent       Screening  Frequency  Date Last Completed   Spiritual and Cultural Beliefs   Screening  each visit or episode of care 8/15/2024   Falls Risk Screening  every ambulatory visit [unfilled]   Pain Screening  annually at primary care visit  8/15/2024   Domestic Violence screening  annually at primary care visit 8/15/2024   Elder Abuse Screening  annually at primary care visit 7/25/2024   Depression Screening  annually in the primary care setting 8/15/2024   Suicide Risk Screening  annually in the primary care setting 7/25/2024   Nutrition and Food Insecurity   Screening  at least annually at primary care visit     Key Learner  annually in the primary care setting 8/15/2024   Drug  Screen  8/15/2024  1:14 PM   Alcohol Screen  8/15/2024  1:14 PM   Advance Directive  7/25/2024          Time Calculation  Start Time: 0158  Stop Time: 0229  Time Calculation (min): 31 min  PT Evaluation Time Entry  PT Re-Evaluation Time Entry: 15  PT Therapeutic Procedures Time Entry  Neuromuscular Re-Education Time Entry: 8  Therapeutic Exercise Time Entry: 8

## 2024-10-16 ENCOUNTER — APPOINTMENT (OUTPATIENT)
Dept: PHYSICAL THERAPY | Facility: CLINIC | Age: 89
End: 2024-10-16
Payer: MEDICAID

## 2024-11-01 ENCOUNTER — APPOINTMENT (OUTPATIENT)
Dept: PRIMARY CARE | Facility: CLINIC | Age: 89
End: 2024-11-01
Payer: MEDICAID

## 2024-11-08 ENCOUNTER — TREATMENT (OUTPATIENT)
Dept: PHYSICAL THERAPY | Facility: CLINIC | Age: 89
End: 2024-11-08
Payer: MEDICAID

## 2024-11-08 DIAGNOSIS — G89.29 CHRONIC PAIN OF RIGHT KNEE: Primary | ICD-10-CM

## 2024-11-08 DIAGNOSIS — R42 DIZZINESS: ICD-10-CM

## 2024-11-08 DIAGNOSIS — M25.561 CHRONIC PAIN OF RIGHT KNEE: Primary | ICD-10-CM

## 2024-11-08 PROCEDURE — 97110 THERAPEUTIC EXERCISES: CPT | Mod: GP | Performed by: PHYSICAL THERAPIST

## 2024-11-08 NOTE — PROGRESS NOTES
Physical Therapy Re - Examination Note    Patient Name: Ayesha Linton  MRN Number: [id number]  Initial Examination Date: 11/8/2024  Referring Clinician: Jeferson Tamayo DO  Reason for Visit: Right knee pain    Insurance  Visit Number: 3   Approved Visits: 30   Certification/ POC Period: 9/4/24 - 10/30/24  Coverage: Payor: MEDICAID / Plan: MEDICAID / Product Type: *No Product type* /     Precautions/ History  Chronic low back pain; dizziness; weakness; spinal stenosis; unsteadiness on feet.    Problem List  Problem List Items Addressed This Visit             ICD-10-CM    Dizziness R42    Chronic pain of right knee - Primary M25.561, G89.29     Subjective  Has really been struggling to get her blood pressure under control, medication has been modified several times and still seems to be too low. Even when he blood pressure is at the right level she feels miserable. She took half the dosage today, had some coffee and some chocolate today. Had another injection in her right knee yesterday, will have an injection in her left knee. For a months she had bronchitis. Hard to measure progress because of health set backs. She was doing better but now balance is not good. She was able to do the exercises 2 months ago. Exercises have been on and off.     Treatment  Vitals: 112/ 79 mmHg    Assessment  No treatment provided on this date as a result of patient not feeling well. Patients primary care provider was contacted with information regarding blood pressure changes and how she is feeling.     Problem List:   - limitations in knee AROM  - lack of appropriate HEP  - dizziness  - reduction in postural control/stability  - unsteadiness while walking with head movements    Plan  Core exercises as well.     Home Exercise Program  Access Code: [email or web address]  URL: [email or web address]  Date: 10/09/2024  Prepared by: Gurpreet Smiley    Exercises  - Seated Knee Flexion Extension AROM   - 3 x weekly - 2 sets - 10-20  reps  - Sit to Stand Without Arm Support  - 2 x weekly - 2 sets - 5-10 reps  - Corner Balance Feet Together: Eyes Open With Head Turns  - 3 x weekly - 1 min hold  - Corner Balance Feet Together With Eyes Closed  - 3 x weekly - 1 min hold  - Seated Flexion Stretch  - 3 x weekly - 10 reps - 10 sec hold    Planned interventions include: Patient education, Home exercise program, and Therapeutic exercise .     1 visit / Week for 8 weeks    Goals  - Increase right knee AROM to greater than or equal to 4-115° for improvements in functional mobility for stairs, sit to stand, tranfers into can and to reduce dysfunction contributing to pain.  - Full resolution of dizziness for improvements in quality of life.  - Patient to have no falls and negative test on condition 7 within [facility] SOP testing to improve postural control and balance.  - Patient to demonstrates capability of walking for 2 minutes without UE support and vertical/ horizontal at 100 bpm without unsteadiness.    Plan developed in agreement with patient.    Personal Factors Affecting Care: None  PT Clinical Presentation: Stable and/or uncomplicated characteristics  Rehab Potential: Excellent       Screening  Frequency  Date Last Completed   Spiritual and Cultural Beliefs   Screening  each visit or episode of care 8/15/2024   Falls Risk Screening  every ambulatory visit [unfilled]   Pain Screening  annually at primary care visit  8/15/2024   Domestic Violence screening  annually at primary care visit 8/15/2024   Elder Abuse Screening  annually at primary care visit 7/25/2024   Depression Screening  annually in the primary care setting 8/15/2024   Suicide Risk Screening  annually in the primary care setting 7/25/2024   Nutrition and Food Insecurity   Screening  at least annually at primary care visit     Key Learner  annually in the primary care setting 8/15/2024   Drug Screen  8/15/2024  1:14 PM   Alcohol Screen  8/15/2024  1:14 PM   Advance Directive  7/25/2024                 PT Therapeutic Procedures Time Entry  Therapeutic Exercise Time Entry: 8

## 2024-11-21 ENCOUNTER — TREATMENT (OUTPATIENT)
Dept: PHYSICAL THERAPY | Facility: CLINIC | Age: 89
End: 2024-11-21
Payer: MEDICAID

## 2024-11-21 DIAGNOSIS — M25.561 CHRONIC PAIN OF RIGHT KNEE: Primary | ICD-10-CM

## 2024-11-21 DIAGNOSIS — R42 DIZZINESS: ICD-10-CM

## 2024-11-21 DIAGNOSIS — G89.29 CHRONIC PAIN OF RIGHT KNEE: Primary | ICD-10-CM

## 2024-11-21 PROCEDURE — 97110 THERAPEUTIC EXERCISES: CPT | Mod: GP | Performed by: PHYSICAL THERAPIST

## 2024-11-21 PROCEDURE — 97112 NEUROMUSCULAR REEDUCATION: CPT | Mod: GP | Performed by: PHYSICAL THERAPIST

## 2024-11-21 NOTE — PROGRESS NOTES
Physical Therapy Re - Examination Note    Patient Name: Ayesha Linton  MRN Number: [id number]  Initial Examination Date: 11/21/2024  Referring Clinician: Jeferson Tamayo DO  Reason for Visit: Right knee pain    Insurance  Visit Number: 4   Approved Visits: 30   Certification/ POC Period: 9/4/24 - 10/30/24  Coverage: Payor: MEDICAID / Plan: MEDICAID / Product Type: *No Product type* /     Precautions/ History  Chronic low back pain; dizziness; weakness; spinal stenosis; unsteadiness on feet.    Problem List  Problem List Items Addressed This Visit             ICD-10-CM    Dizziness R42    Chronic pain of right knee - Primary M25.561, G89.29     Subjective  Blood pressure has been at normal level but she feels weak. Working on adjusting medication with doctor. She has been doing the exercises at home. Feels comfort finishing with therapy today.     Treatment    Pain scale: Right knee  0/10 maximum in the last week    Inspection  Gait: capable of walking into the clinic, much more stable.    Objective  Knee AROM Left Right Comments   Flexion 113° 110°    Extension (4)° 0°      Hip Strength Testing Left Right Comments   Flexion 4/5 4/5    Abduction  4+/5 4+/5    Abduction with Extension 4/5 4/5      Assessment  Good improvements in lower body strength. Progression to independent exercising at home.    Problem List:   - limitations in knee AROM  - lack of appropriate HEP  - dizziness  - reduction in postural control/stability  - unsteadiness while walking with head movements    Plan  Core exercises as well.     Home Exercise Program  Access Code: [email or web address]  URL: [email or web address]  Date: 10/09/2024  Prepared by: Gurpreet Smiley    Exercises  - Seated Knee Flexion Extension AROM   - 3 x weekly - 2 sets - 10-20 reps  - Sit to Stand Without Arm Support  - 2 x weekly - 2 sets - 5-10 reps  - Corner Balance Feet Together: Eyes Open With Head Turns  - 3 x weekly - 1 min hold  - Corner Balance Feet  Together With Eyes Closed  - 3 x weekly - 1 min hold  - Seated Flexion Stretch  - 3 x weekly - 10 reps - 10 sec hold    Planned interventions include: Patient education, Home exercise program, and Therapeutic exercise .     1 visit / Week for 8 weeks    Goals  - Increase right knee AROM to greater than or equal to 4-115° for improvements in functional mobility for stairs, sit to stand, tranfers into can and to reduce dysfunction contributing to pain.  - Full resolution of dizziness for improvements in quality of life.  - Patient to have no falls and negative test on condition 7 within [facility] SOP testing to improve postural control and balance.  - Patient to demonstrates capability of walking for 2 minutes without UE support and vertical/ horizontal at 100 bpm without unsteadiness.    Plan developed in agreement with patient.    Personal Factors Affecting Care: None  PT Clinical Presentation: Stable and/or uncomplicated characteristics  Rehab Potential: Excellent       Screening  Frequency  Date Last Completed   Spiritual and Cultural Beliefs   Screening  each visit or episode of care 8/15/2024   Falls Risk Screening  every ambulatory visit    Pain Screening  annually at primary care visit  8/15/2024   Domestic Violence screening  annually at primary care visit 8/15/2024   Elder Abuse Screening  annually at primary care visit 7/25/2024   Depression Screening  annually in the primary care setting 8/15/2024   Suicide Risk Screening  annually in the primary care setting 7/25/2024   Nutrition and Food Insecurity   Screening  at least annually at primary care visit     Key Learner  annually in the primary care setting 8/15/2024   Drug Screen  8/15/2024  1:14 PM   Alcohol Screen  8/15/2024  1:14 PM   Advance Directive  7/25/2024          Time Calculation  Start Time: 0337  Stop Time: 0407  Time Calculation (min): 30 min     PT Therapeutic Procedures Time Entry  Neuromuscular Re-Education Time Entry: 8  Therapeutic Exercise  Time Entry: 20

## 2024-12-11 ENCOUNTER — TELEPHONE (OUTPATIENT)
Dept: PRIMARY CARE | Facility: CLINIC | Age: 89
End: 2024-12-11

## 2024-12-11 NOTE — TELEPHONE ENCOUNTER
Would like to know if you would to know if you will sign orders to start home physical & occupational. Patient wad d/c'd from [facility] today.  Please call & advise.  Thank you

## 2024-12-12 ENCOUNTER — PATIENT OUTREACH (OUTPATIENT)
Dept: PRIMARY CARE | Facility: CLINIC | Age: 89
End: 2024-12-12
Payer: MEDICAID

## 2024-12-23 NOTE — TELEPHONE ENCOUNTER
----- Message from Nurse Sophie NEGRO sent at 12/12/2024  3:10 PM EST -----    ----- Message -----  From: Watson Agosto LPN  Sent: 12/12/2024   2:36 PM EST  To: Luisito Barros Clinical Support Staff    Discharge Facility: [facility]  Discharge Diagnosis: Right Arm Numbness  Admission Date: 12/9/24  Discharge Date: 12/11/24    Unsuccessful attempts x2 to reach patient for PCP Follow-up  Please have office staff reach out to patient and schedule an appointment within 14 days from discharge date.          Watson Agosto LPN

## 2024-12-24 ENCOUNTER — PATIENT OUTREACH (OUTPATIENT)
Dept: PRIMARY CARE | Facility: CLINIC | Age: 89
End: 2024-12-24
Payer: MEDICAID

## 2024-12-24 NOTE — PROGRESS NOTES
Unable to reach patient for call back after recent hospitalization.   LVM with call back number for patient to call if needed   If no voicemail available call attempts x 2 were made to contact the patient to assist with any questions or concerns patient may have.    Watson Agosto LPN